# Patient Record
Sex: FEMALE | Race: OTHER | HISPANIC OR LATINO | URBAN - METROPOLITAN AREA
[De-identification: names, ages, dates, MRNs, and addresses within clinical notes are randomized per-mention and may not be internally consistent; named-entity substitution may affect disease eponyms.]

---

## 2023-06-05 ENCOUNTER — EMERGENCY (EMERGENCY)
Facility: HOSPITAL | Age: 88
LOS: 1 days | Discharge: HOME | End: 2023-06-05
Attending: STUDENT IN AN ORGANIZED HEALTH CARE EDUCATION/TRAINING PROGRAM | Admitting: STUDENT IN AN ORGANIZED HEALTH CARE EDUCATION/TRAINING PROGRAM
Payer: MEDICARE

## 2023-06-05 VITALS
SYSTOLIC BLOOD PRESSURE: 149 MMHG | OXYGEN SATURATION: 100 % | HEART RATE: 87 BPM | RESPIRATION RATE: 16 BRPM | DIASTOLIC BLOOD PRESSURE: 81 MMHG | TEMPERATURE: 98 F

## 2023-06-05 VITALS
HEART RATE: 90 BPM | OXYGEN SATURATION: 100 % | SYSTOLIC BLOOD PRESSURE: 124 MMHG | TEMPERATURE: 100 F | RESPIRATION RATE: 18 BRPM | DIASTOLIC BLOOD PRESSURE: 64 MMHG

## 2023-06-05 PROCEDURE — 99284 EMERGENCY DEPT VISIT MOD MDM: CPT | Mod: 25

## 2023-06-05 PROCEDURE — 70450 CT HEAD/BRAIN W/O DYE: CPT | Mod: 26,MA

## 2023-06-05 PROCEDURE — G0168: CPT

## 2023-06-05 PROCEDURE — 93010 ELECTROCARDIOGRAM REPORT: CPT

## 2023-06-05 PROCEDURE — 72125 CT NECK SPINE W/O DYE: CPT | Mod: 26,MA

## 2023-06-05 NOTE — ED PROVIDER NOTE - OBJECTIVE STATEMENT
89F h/o HTN presents s/p fall. Pt was walking to get her cane when she tripped and fell, striking head on floor. Got up under own strength and walked outside to her son-in-law who alerted daughter and brought to ED. No LOC. Ambulatory. Denies weakness/numbness. No headache, dizziness, neck pain, chest pain, vision changes, sob, hip pain or other complaints. Full memory of event. No prodromal symptoms prior to fall.

## 2023-06-05 NOTE — ED PROVIDER NOTE - NSFOLLOWUPINSTRUCTIONS_ED_ALL_ED_FT
Follow up with your primary care doctor    Take tylenol 650mg every 6 hours as needed for pain    Wash face like you normally would though avoid scrubbing the area    Return to ED if wound opens, area becomes red, any pus/drainage from site, dizziness/lightheadedness, more sleepy than usual, vision changes or anything else concerning to you,

## 2023-06-05 NOTE — ED ADULT TRIAGE NOTE - CHIEF COMPLAINT QUOTE
c/o trip and fall at home , presents with 1 cm forehead laceration no bleeding noted, hx of HLD, denies blood thinner use, PERRLA extremities are strong and equal B/L

## 2023-06-05 NOTE — ED PROVIDER NOTE - PATIENT PORTAL LINK FT
You can access the FollowMyHealth Patient Portal offered by [facility] by registering at the following website: [email or web address]. By joining [employer]’s FollowMyHealth portal, you will also be able to view your health information using other applications (apps) compatible with our system.

## 2025-01-12 ENCOUNTER — INPATIENT (INPATIENT)
Facility: HOSPITAL | Age: 89
LOS: 2 days | Discharge: NURSING, REHAB OR LONG-TERM CARE | End: 2025-01-15
Attending: STUDENT IN AN ORGANIZED HEALTH CARE EDUCATION/TRAINING PROGRAM | Admitting: STUDENT IN AN ORGANIZED HEALTH CARE EDUCATION/TRAINING PROGRAM
Payer: MEDICARE

## 2025-01-12 VITALS
OXYGEN SATURATION: 96 % | TEMPERATURE: 98 F | RESPIRATION RATE: 18 BRPM | SYSTOLIC BLOOD PRESSURE: 149 MMHG | HEART RATE: 68 BPM | WEIGHT: 89.95 LBS | DIASTOLIC BLOOD PRESSURE: 76 MMHG

## 2025-01-12 DIAGNOSIS — R79.89 OTHER SPECIFIED ABNORMAL FINDINGS OF BLOOD CHEMISTRY: ICD-10-CM

## 2025-01-12 DIAGNOSIS — Z79.899 OTHER LONG TERM (CURRENT) DRUG THERAPY: ICD-10-CM

## 2025-01-12 DIAGNOSIS — R29.6 REPEATED FALLS: ICD-10-CM

## 2025-01-12 DIAGNOSIS — E78.5 HYPERLIPIDEMIA, UNSPECIFIED: ICD-10-CM

## 2025-01-12 DIAGNOSIS — E03.9 HYPOTHYROIDISM, UNSPECIFIED: ICD-10-CM

## 2025-01-12 DIAGNOSIS — I10 ESSENTIAL (PRIMARY) HYPERTENSION: ICD-10-CM

## 2025-01-12 DIAGNOSIS — I21.4 NON-ST ELEVATION (NSTEMI) MYOCARDIAL INFARCTION: ICD-10-CM

## 2025-01-12 DIAGNOSIS — R53.1 WEAKNESS: ICD-10-CM

## 2025-01-12 LAB
ADD ON TEST-SPECIMEN IN LAB: SIGNIFICANT CHANGE UP
ADD ON TEST-SPECIMEN IN LAB: SIGNIFICANT CHANGE UP
ALBUMIN SERPL ELPH-MCNC: 3.6 G/DL — SIGNIFICANT CHANGE UP (ref 3.3–5)
ALP SERPL-CCNC: 64 U/L — SIGNIFICANT CHANGE UP (ref 40–120)
ALT FLD-CCNC: 94 U/L — HIGH (ref 4–33)
ANION GAP SERPL CALC-SCNC: 17 MMOL/L — HIGH (ref 7–14)
ANISOCYTOSIS BLD QL: SLIGHT — SIGNIFICANT CHANGE UP
AST SERPL-CCNC: 74 U/L — HIGH (ref 4–32)
BASOPHILS # BLD AUTO: 0 K/UL — SIGNIFICANT CHANGE UP (ref 0–0.2)
BASOPHILS NFR BLD AUTO: 0 % — SIGNIFICANT CHANGE UP (ref 0–2)
BILIRUB SERPL-MCNC: 0.7 MG/DL — SIGNIFICANT CHANGE UP (ref 0.2–1.2)
BUN SERPL-MCNC: 52 MG/DL — HIGH (ref 7–23)
CALCIUM SERPL-MCNC: 9.2 MG/DL — SIGNIFICANT CHANGE UP (ref 8.4–10.5)
CHLORIDE SERPL-SCNC: 102 MMOL/L — SIGNIFICANT CHANGE UP (ref 98–107)
CK SERPL-CCNC: 529 U/L — HIGH (ref 25–170)
CO2 SERPL-SCNC: 22 MMOL/L — SIGNIFICANT CHANGE UP (ref 22–31)
CREAT SERPL-MCNC: 0.7 MG/DL — SIGNIFICANT CHANGE UP (ref 0.5–1.3)
EGFR: 76 ML/MIN/1.73M2 — SIGNIFICANT CHANGE UP
EOSINOPHIL # BLD AUTO: 0 K/UL — SIGNIFICANT CHANGE UP (ref 0–0.5)
EOSINOPHIL NFR BLD AUTO: 0 % — SIGNIFICANT CHANGE UP (ref 0–6)
FLUAV AG NPH QL: SIGNIFICANT CHANGE UP
FLUBV AG NPH QL: SIGNIFICANT CHANGE UP
GLUCOSE SERPL-MCNC: 121 MG/DL — HIGH (ref 70–99)
HCT VFR BLD CALC: 43.4 % — SIGNIFICANT CHANGE UP (ref 34.5–45)
HGB BLD-MCNC: 14.5 G/DL — SIGNIFICANT CHANGE UP (ref 11.5–15.5)
IANC: 9.81 K/UL — HIGH (ref 1.8–7.4)
LYMPHOCYTES # BLD AUTO: 0.28 K/UL — LOW (ref 1–3.3)
LYMPHOCYTES # BLD AUTO: 2.6 % — LOW (ref 13–44)
MACROCYTES BLD QL: SLIGHT — SIGNIFICANT CHANGE UP
MAGNESIUM SERPL-MCNC: 2.2 MG/DL — SIGNIFICANT CHANGE UP (ref 1.6–2.6)
MCHC RBC-ENTMCNC: 30.8 PG — SIGNIFICANT CHANGE UP (ref 27–34)
MCHC RBC-ENTMCNC: 33.4 G/DL — SIGNIFICANT CHANGE UP (ref 32–36)
MCV RBC AUTO: 92.1 FL — SIGNIFICANT CHANGE UP (ref 80–100)
METAMYELOCYTES # FLD: 0.9 % — SIGNIFICANT CHANGE UP (ref 0–1)
MONOCYTES # BLD AUTO: 0.47 K/UL — SIGNIFICANT CHANGE UP (ref 0–0.9)
MONOCYTES NFR BLD AUTO: 4.3 % — SIGNIFICANT CHANGE UP (ref 2–14)
NEUTROPHILS # BLD AUTO: 10.09 K/UL — HIGH (ref 1.8–7.4)
NEUTROPHILS NFR BLD AUTO: 91.4 % — HIGH (ref 43–77)
NEUTS BAND # BLD: 0.8 % — SIGNIFICANT CHANGE UP (ref 0–6)
OVALOCYTES BLD QL SMEAR: SLIGHT — SIGNIFICANT CHANGE UP
PHOSPHATE SERPL-MCNC: 3 MG/DL — SIGNIFICANT CHANGE UP (ref 2.5–4.5)
PLAT MORPH BLD: NORMAL — SIGNIFICANT CHANGE UP
PLATELET # BLD AUTO: 216 K/UL — SIGNIFICANT CHANGE UP (ref 150–400)
PLATELET COUNT - ESTIMATE: NORMAL — SIGNIFICANT CHANGE UP
POIKILOCYTOSIS BLD QL AUTO: SLIGHT — SIGNIFICANT CHANGE UP
POTASSIUM SERPL-MCNC: 4 MMOL/L — SIGNIFICANT CHANGE UP (ref 3.5–5.3)
POTASSIUM SERPL-SCNC: 4 MMOL/L — SIGNIFICANT CHANGE UP (ref 3.5–5.3)
PROT SERPL-MCNC: 6.2 G/DL — SIGNIFICANT CHANGE UP (ref 6–8.3)
RBC # BLD: 4.71 M/UL — SIGNIFICANT CHANGE UP (ref 3.8–5.2)
RBC # FLD: 13.6 % — SIGNIFICANT CHANGE UP (ref 10.3–14.5)
RBC BLD AUTO: NORMAL — SIGNIFICANT CHANGE UP
RSV RNA NPH QL NAA+NON-PROBE: SIGNIFICANT CHANGE UP
SARS-COV-2 RNA SPEC QL NAA+PROBE: SIGNIFICANT CHANGE UP
SODIUM SERPL-SCNC: 141 MMOL/L — SIGNIFICANT CHANGE UP (ref 135–145)
TROPONIN T, HIGH SENSITIVITY RESULT: 217 NG/L — CRITICAL HIGH
TROPONIN T, HIGH SENSITIVITY RESULT: 257 NG/L — CRITICAL HIGH
WBC # BLD: 10.94 K/UL — HIGH (ref 3.8–10.5)
WBC # FLD AUTO: 10.94 K/UL — HIGH (ref 3.8–10.5)

## 2025-01-12 PROCEDURE — 72125 CT NECK SPINE W/O DYE: CPT | Mod: 26

## 2025-01-12 PROCEDURE — 99285 EMERGENCY DEPT VISIT HI MDM: CPT | Mod: FS

## 2025-01-12 PROCEDURE — 70450 CT HEAD/BRAIN W/O DYE: CPT | Mod: 26

## 2025-01-12 PROCEDURE — 99497 ADVNCD CARE PLAN 30 MIN: CPT | Mod: 25

## 2025-01-12 PROCEDURE — 99223 1ST HOSP IP/OBS HIGH 75: CPT

## 2025-01-12 PROCEDURE — 71045 X-RAY EXAM CHEST 1 VIEW: CPT | Mod: 26

## 2025-01-12 PROCEDURE — 93010 ELECTROCARDIOGRAM REPORT: CPT | Mod: 77

## 2025-01-12 RX ORDER — GINKGO BILOBA 40 MG
3 CAPSULE ORAL AT BEDTIME
Refills: 0 | Status: DISCONTINUED | OUTPATIENT
Start: 2025-01-12 | End: 2025-01-15

## 2025-01-12 RX ORDER — ASPIRIN 81 MG
162 TABLET, DELAYED RELEASE (ENTERIC COATED) ORAL ONCE
Refills: 0 | Status: COMPLETED | OUTPATIENT
Start: 2025-01-12 | End: 2025-01-12

## 2025-01-12 RX ORDER — ONDANSETRON 4 MG/1
4 TABLET ORAL EVERY 8 HOURS
Refills: 0 | Status: DISCONTINUED | OUTPATIENT
Start: 2025-01-12 | End: 2025-01-15

## 2025-01-12 RX ORDER — SODIUM CHLORIDE 9 MG/ML
500 INJECTION, SOLUTION INTRAMUSCULAR; INTRAVENOUS; SUBCUTANEOUS ONCE
Refills: 0 | Status: COMPLETED | OUTPATIENT
Start: 2025-01-12 | End: 2025-01-12

## 2025-01-12 RX ORDER — LEVOTHYROXINE SODIUM 175 UG/1
75 TABLET ORAL DAILY
Refills: 0 | Status: DISCONTINUED | OUTPATIENT
Start: 2025-01-12 | End: 2025-01-15

## 2025-01-12 RX ORDER — HEPARIN SODIUM 1000 [USP'U]/ML
5000 INJECTION, SOLUTION INTRAVENOUS; SUBCUTANEOUS EVERY 12 HOURS
Refills: 0 | Status: DISCONTINUED | OUTPATIENT
Start: 2025-01-13 | End: 2025-01-15

## 2025-01-12 RX ORDER — ACETAMINOPHEN 80 MG/.8ML
650 SOLUTION/ DROPS ORAL EVERY 6 HOURS
Refills: 0 | Status: DISCONTINUED | OUTPATIENT
Start: 2025-01-12 | End: 2025-01-15

## 2025-01-12 RX ORDER — MAG HYDROX/ALUMINUM HYD/SIMETH 200-200-20
30 SUSPENSION, ORAL (FINAL DOSE FORM) ORAL EVERY 4 HOURS
Refills: 0 | Status: DISCONTINUED | OUTPATIENT
Start: 2025-01-12 | End: 2025-01-15

## 2025-01-12 RX ORDER — LOSARTAN POTASSIUM 100 MG/1
100 TABLET, FILM COATED ORAL
Refills: 0 | Status: DISCONTINUED | OUTPATIENT
Start: 2025-01-12 | End: 2025-01-15

## 2025-01-12 RX ORDER — ASPIRIN 81 MG
81 TABLET, DELAYED RELEASE (ENTERIC COATED) ORAL DAILY
Refills: 0 | Status: DISCONTINUED | OUTPATIENT
Start: 2025-01-13 | End: 2025-01-15

## 2025-01-12 RX ORDER — ATORVASTATIN CALCIUM 40 MG/1
10 TABLET, FILM COATED ORAL AT BEDTIME
Refills: 0 | Status: DISCONTINUED | OUTPATIENT
Start: 2025-01-12 | End: 2025-01-15

## 2025-01-12 RX ADMIN — Medication 162 MILLIGRAM(S): at 22:49

## 2025-01-12 RX ADMIN — SODIUM CHLORIDE 500 MILLILITER(S): 9 INJECTION, SOLUTION INTRAMUSCULAR; INTRAVENOUS; SUBCUTANEOUS at 14:29

## 2025-01-12 RX ADMIN — ATORVASTATIN CALCIUM 10 MILLIGRAM(S): 40 TABLET, FILM COATED ORAL at 22:49

## 2025-01-12 NOTE — ED ADULT NURSE NOTE - NSFALLHARMRISKINTERV_ED_ALL_ED

## 2025-01-12 NOTE — CONSULT NOTE ADULT - SUBJECTIVE AND OBJECTIVE BOX
Cardiology Attending Consultation Note     Patient seen and evaluated at bedside    Chief Complaint: s/p fall     HPI:  89 yr old female with a pmh of HTN, HLD, hypothyroidism, osteoporosis who presents with generalized weakness and increased number of falls over the past 4 days. PT reports the other day she had a fall where she hit her head- denies LOC. Reports another time she fell off the couch and was not able to life herself up.   Denies  headache, dizziness, chest pain, palpitations, SOB, abdominal pain, joint pain, diarrhea/constipation, urinary symptoms.  Vitals: T 98, HR 69, /57, RR 16 satting 100% RA (12 Jan 2025 21:54)      PMHx:   HTN (hypertension)    HLD (hyperlipidemia)    Hypothyroidism    Osteoporosis        PSHx:   No significant past surgical history        Allergies:  No Known Allergies      Home Meds:    Current Medications:   acetaminophen     Tablet .. 650 milliGRAM(s) Oral every 6 hours PRN  aluminum hydroxide/magnesium hydroxide/simethicone Suspension 30 milliLiter(s) Oral every 4 hours PRN  amLODIPine   Tablet 5 milliGRAM(s) Oral with breakfast  atorvastatin 10 milliGRAM(s) Oral at bedtime  levothyroxine 75 MICROGram(s) Oral daily  losartan 100 milliGRAM(s) Oral with breakfast  melatonin 3 milliGRAM(s) Oral at bedtime PRN  ondansetron Injectable 4 milliGRAM(s) IV Push every 8 hours PRN      FAMILY HISTORY:  No pertinent family history in first degree relatives        Social History: Personally reviewed   No tobacco, EtOH or IVDU     REVIEW OF SYSTEMS:  Constitutional:     [x ] negative [ ] fevers [ ] chills [ ] weight loss [ ] weight gain  HEENT:                  [x ] negative [ ] dry eyes [ ] eye irritation [ ] postnasal drip [ ] nasal congestion  CV:                         [ x] negative  [ ] chest pain [ ] orthopnea [ ] palpitations [ ] murmur  Resp:                     [x ] negative [ ] cough [ ] shortness of breath [ ] dyspnea [ ] wheezing [ ] sputum [ ]hemoptysis  GI:                          [ x] negative [ ] nausea [ ] vomiting [ ] diarrhea [ ] constipation [ ] abd pain [ ] dysphagia   :                        [ x] negative [ ] dysuria [ ] nocturia [ ] hematuria [ ] increased urinary frequency  Musculoskeletal: [x ] negative [ ] back pain [ ] myalgias [ ] arthralgias [ ] fracture  Skin:                       [ x] negative [ ] rash [ ] itch  Neurological:        [ x] negative [ ] headache [ ] dizziness [ ] syncope [ ] weakness [ ] numbness  Psychiatric:           [ x] negative [ ] anxiety [ ] depression  Endocrine:            [ x] negative [ ] diabetes [ ] thyroid problem  Heme/Lymph:      [ x] negative [ ] anemia [ ] bleeding problem  Allergic/Immune: [ x] negative [ ] itchy eyes [ ] nasal discharge [ ] hives [ ] angioedema    [ x] All other systems negative  [ ] Unable to assess ROS due to      Physical Exam:  T(F): 97.6 (01-12), Max: 98 (01-12)  HR: 76 (01-12) (68 - 76)  BP: 137/51 (01-12) (124/84 - 155/57)  RR: 17 (01-12)  SpO2: 97% (01-12)    Gen: Well appearing   HENNT: No JVP   CV: regular rate, regular rhtyhm, no murmur   Pulm: clear to auscultation bilaterally   Abdomen: Non-tender, non-distended   Ext: no edema b/l   Neuro: grossly non-focal     Cardiovascular Diagnostic Testing:      Labs: Personally reviewed                        14.5   10.94 )-----------( 216      ( 12 Jan 2025 14:05 )             43.4     01-12    141  |  102  |  52[H]  ----------------------------<  121[H]  4.0   |  22  |  0.70    Ca    9.2      12 Jan 2025 14:05  Phos  3.0     01-12  Mg     2.20     01-12    TPro  6.2  /  Alb  3.6  /  TBili  0.7  /  DBili  x   /  AST  74[H]  /  ALT  94[H]  /  AlkPhos  64  01-12

## 2025-01-12 NOTE — ED ADULT NURSE NOTE - OBJECTIVE STATEMENT
pt [id number] to ED s/p fall pt appears alert responsive and appropriate pt currently denies any pain md eval in progress v/s charted

## 2025-01-12 NOTE — H&P ADULT - NSHPPHYSICALEXAM_GEN_ALL_CORE
PHYSICAL EXAM:  GENERAL: NAD, comfortable at bedside   HEAD:  Atraumatic, Normocephalic  EYES: EOMI, PERRL, conjunctiva and sclera clear  NECK: Supple, No JVD  CHEST/LUNG: Clear to auscultation bilaterally; No wheezes, rales or rhonchi  HEART: Regular rate and rhythm; No murmurs, rubs, or gallops, (+)S1, S2  ABDOMEN: Soft, Nontender, Nondistended; Normal Bowel sounds   EXTREMITIES:  2+ Peripheral Pulses, No clubbing, cyanosis, or edema  PSYCH: normal mood and affect  NEUROLOGY: AAOx3, moving all extremities spontaneously  SKIN: chronic venous insufficiency changes of bilateral lower extremities

## 2025-01-12 NOTE — H&P ADULT - PROBLEM SELECTOR PLAN 2
New  Trop 257-> 217, -> 529  Pt denies chest pain   Per ED note: Cardiology note pending and ED signed out no need for heparin drip  Will start aspirin 81mg daily and continue simvastatin 20mg daily formulary  trend trop, repeat EKG New  Trop 257-> 217, -> 529  Pt denies chest pain   Per ED note: Cardiology note pending and ED signed out no need for heparin drip- called cards said to call back as with a pt   Will start aspirin 81mg daily and continue simvastatin 20mg daily formulary  trend trop, repeat EKG, tele, TTE New  Trop 257-> 217, -> 529  Pt denies chest pain   Per ED note: Cardiology note pending and ED signed out no need for heparin drip=- Called Cardiology who will see the pt  Will start aspirin 81mg daily and continue simvastatin 20mg daily formulary  trend trop, repeat EKG, tele, TTE New  Trop 257-> 217, -> 529  Pt denies chest pain   Cardiology consulted: given pt's advanced age and absence of ischemic sxs, would recommend conservative management and hold off heparin gtt. recommend TTE ECHO inpt for eval of LV/RV function and any significant WMAs.   Will start aspirin 81mg daily and continue simvastatin 20mg daily formulary  trend trop, repeat EKG, tele, TTE

## 2025-01-12 NOTE — CONSULT NOTE ADULT - ASSESSMENT
89F w/ HTN, HLD, hypothyroidism, and osteoporosis p/w generalized weakness and s/p fall to the ER. Cardiology consulted for elevated troponin.     1. HTN   2. HLD   3. Generalized weakness   4. S/p fall   5. Elevated troponin     -elevated troponin with CKMB and CK. no cp and EKG with NSR and mild TWI in V3-V4   -given pt's advanced age and absence of ischemic sxs, would recommend conservative management and hold off heparin gtt   -cont bp control, cont home losartan and amlodipine   -cont asa and statin   -recommend TTE ECHO inpt for eval of LV/RV function and any significant WMAs   -monitor on tele   -GOC discussion recommended     Adry Huitron MD [facility]  Cardiology Attending, [email or web address]  [employer] on [employer] Team

## 2025-01-12 NOTE — ED ADULT NURSE REASSESSMENT NOTE - NS ED NURSE REASSESS COMMENT FT1
Received pt from Roland KEITH. pt sitting quietly in stretcher NAD noted at this time. NSR on CM. No new orders at this time pt safety ongoing.

## 2025-01-12 NOTE — ED ADULT TRIAGE NOTE - CHIEF COMPLAINT QUOTE
pt brought in by EMS from home after sliding off the couch 2xs. pt denies hitting her head, no LOC. Pt lives alone and walks with a walker at baseline. pt denies chest pain, sob, n/v/d, fever or chills.

## 2025-01-12 NOTE — H&P ADULT - CONVERSATION DETAILS
Pt would like to remain FULL CODE.  If required, the pt would like the following interventions: CPR and/or intubation with mechanical ventilation.     Pt reports family is to visit from [place] and to speak with her Niece when she is here

## 2025-01-12 NOTE — ED PROVIDER NOTE - CLINICAL SUMMARY MEDICAL DECISION MAKING FREE TEXT BOX
- 82-year-old female past medical history of hypertension Alert and oriented x 3 presenting to ED from home via ambulance for fall.  Patient states over the last several days of been feeling generalized weakness.  States had slid down off her couch, bumped her head on the floor without loss of consciousness, no head neck or back pain and was unable to push herself back up off the floor.  Patient does endorse more frequent falls, denies any pain.  No vision changes, lightheadedness or dizziness, chest pain, palpitations, shortness of breath, Cough, fever/chills, urinary symptoms, abdominal pain, nausea/vomiting/diarrhea. Patient states lives at home by herself and is normally able to walk with a walker.  Patient only family is niece and her daughter who lives in [place].  - On exam patient alert and oriented x 4, states did strike head on floor after sliding down couch, no LOC, head neck or back pain.  Patient neuroexam intact, no midline spinal tenderness.  Low suspicion for ICH or acute cervical injury-given age will obtain CT head and neck to assess.  Given generalized weakness and frequent falls will obtain CMP to assess electrolyte abnormalities, CBC to assess leukocytosis/anemia, UA to assess UTI, EKG/troponin to assess ACS.  Viral swab to assess for flu/COVID/RSV.  Chest x-ray to assess pneumonia.  Dispo pending results and reassessment, likely admit due inability to walk/no support at home.

## 2025-01-12 NOTE — H&P ADULT - PROBLEM SELECTOR PLAN 3
New  pt reports increased falls past four days  Workup for generalized weakness as above   PT/OT consult New  pt reports increased falls past four days  CT head/cervical: no acute findings   Workup for generalized weakness as above   PT/OT consult

## 2025-01-12 NOTE — H&P ADULT - PROBLEM SELECTOR PLAN 6
Chronic stable  Continue levothyroxine 75 mg daily   TSH in AM   [facility]

## 2025-01-12 NOTE — ED PROVIDER NOTE - OBJECTIVE STATEMENT
82-year-old female past medical history of hypertension Alert and oriented x 3 presenting to ED from home via ambulance for fall.  Patient states over the last several days of been feeling generalized weakness.  States had slid down off her couch, bumped her head on the floor without loss of consciousness, no head neck or back pain and was unable to push herself back up off the floor.  Patient does endorse more frequent falls, denies any pain.  No vision changes, lightheadedness or dizziness, chest pain, palpitations, shortness of breath 82-year-old female past medical history of hypertension Alert and oriented x 3 presenting to ED from home via ambulance for fall.  Patient states over the last several days of been feeling generalized weakness.  States had slid down off her couch, bumped her head on the floor without loss of consciousness, no head neck or back pain and was unable to push herself back up off the floor.  Patient does endorse more frequent falls, denies any pain.  No vision changes, lightheadedness or dizziness, chest pain, palpitations, shortness of breath, Cough, fever/chills, urinary symptoms, abdominal pain, nausea/vomiting/diarrhea.Patient  lives at home by herself and is normally able to walk with a walker.  Patient only family is niece and her daughter who lives in [place]. 89-year-old female past medical history of hypertension Alert and oriented x 3 presenting to ED from home via ambulance for fall.  Patient states over the last several days of been feeling generalized weakness.  States had slid down off her couch, bumped her head on the floor without loss of consciousness, no head neck or back pain and was unable to push herself back up off the floor.  Patient does endorse more frequent falls, denies any pain.  No vision changes, lightheadedness or dizziness, chest pain, palpitations, shortness of breath, Cough, fever/chills, urinary symptoms, abdominal pain, nausea/vomiting/diarrhea.Patient  lives at home by herself and is normally able to walk with a walker.  Patient only family is niece and her daughter who lives in [place].

## 2025-01-12 NOTE — ED PROVIDER NOTE - PROGRESS NOTE DETAILS
BRENDA Singh NP: EKG unchanged from previous, no STEMI.  Patient currently denying chest pain/shortness of breath/palpitations.  Initial troponin 257, repeat 217.  CK-MB 32.4, CK6 38.  Cardiology consulted, eval pending.  Patient full code.  Spoke to patient niece on the phone, family and patient updated.  TBA pending cards recs. Nick Mc, DO: received s/o on pt. concern for elevated troponin. ekg non ischemic. plan for cards eval for nstemi eval. I have personally provided the amount of critical care time documented below, excluding time spent on separate procedures. 45 minutes.

## 2025-01-12 NOTE — H&P ADULT - NSHPREVIEWOFSYSTEMS_GEN_ALL_CORE
REVIEW OF SYSTEMS:    CONSTITUTIONAL: +generalized weakness and falls no fevers or chills  EYES/ENT: No visual changes;  No dysphagia; No sore throat; No rhinorrhea; No sinus pain/pressure  NECK: No pain or stiffness  RESPIRATORY: No cough, wheezing, hemoptysis; No shortness of breath  CARDIOVASCULAR: No chest pain or palpitations; No lower extremity edema  GASTROINTESTINAL: No abdominal or epigastric pain. No nausea, vomiting, or hematemesis; No diarrhea or constipation. No melena or hematochezia.  GENITOURINARY: No dysuria, frequency or hematuria  NEUROLOGICAL: No numbness or weakness  MSK: ambulates with a walker   SKIN: No itching, burning, rashes, or lesions   All other review of systems is negative unless indicated above.

## 2025-01-12 NOTE — H&P ADULT - PROBLEM SELECTOR PLAN 1
New  increased generalized weakness over 4 days  CT head/cervical: no acute findings  CXR: no focal consolidation   UA, orthostatics, Flu/COVID/RSV ordered  PT/OT consult

## 2025-01-12 NOTE — H&P ADULT - HISTORY OF PRESENT ILLNESS
89 yr old female with a pmh of HTN, HLD, hypothyroidism, osteoporosis who presents with generalized weakness and increased number of falls over the past 4 days. PT reports the other day she had a fall where she hit her head- denies LOC. Reports another time she fell off the couch and was not able to life herself up.   Denies  headache, dizziness, chest pain, palpitations, SOB, abdominal pain, joint pain, diarrhea/constipation, urinary symptoms.  Vitals: T 98, HR 69, /57, RR 16 satting 100% RA

## 2025-01-12 NOTE — ED ADULT NURSE NOTE - DRUG PRE-SCREENING (DAST -1)
Chon SAFIA Coley   12/15/2017 2:00 PM   Anticoagulation Therapy Visit    Description:  63 year old female   Provider:  TIEN BOSWELL   Department:  Ne Nurse           INR as of 12/15/2017     Today's INR 4.6!      Anticoagulation Summary as of 12/15/2017     INR goal 2.0-3.0   Today's INR 4.6!   Full instructions 12/15: Hold; 12/16: 5 mg; 12/19: 5 mg; Otherwise 7.5 mg on Tue, Thu, Sat; 5 mg all other days   Next INR check 12/22/2017    Indications   Long term current use of anticoagulant therapy [Z79.01]  Cerebral artery occlusion with cerebral infarction (H) [I63.50]         Your next Anticoagulation Clinic appointment(s)     Dec 15, 2017  2:00 PM CST   Anticoagulation Visit with NE ANTI COAG   [facility] ([address] [id number] [phone]            Dec 22, 2017 12:45 PM CST   Anticoagulation Visit with NE ANTI COAG   [facility] ([address] [id number] [phone]              Contact Numbers     Please call [phone] to cancel and/or reschedule your appointment.  Please call [phone] with any problems or questions regarding your therapy          December 2017 Details    Sun Mon Tue Wed Thu Fri Sat          1               2                 3               4               5               6               7               8               9                 10               11               12               13               14               15      Hold   See details      16      5 mg           17      5 mg         18      5 mg         19      5 mg         20      5 mg         21      7.5 mg         22            23                 24               25               26               27               28               29               30                 31                      Date Details   12/15 This INR check       Date of next INR:  12/22/2017          How to take your warfarin dose     To take:  5 mg Take 1 of the 5 mg tablets.    To take:  7.5 mg Take 1.5 of the 5 mg tablets.    Hold Do not take your warfarin dose. See the Details table to the right for additional instructions.                 Statement Selected

## 2025-01-12 NOTE — ED PROVIDER NOTE - ATTENDING APP SHARED VISIT CONTRIBUTION OF CARE
Seen and examined, discussed with NP.  Patient status post fall was sliding off couch landed on side and then bumped her head on floor.  No LOC, denies headache or neck pain.  Patient required assistance to get off the floor.  Has had previous falls but they have been more frequent.  Denies recent major injury.  Patient lives at home alone and at baseline uses walker.  Endorses increased weakness and decreased p.o. intake.  Denies recent illness, no fever/chills, no N/V/D, no urinary complaints.  MMM, clear lungs, heart reg, abd soft, NT to palp, no austyn/guarding, no CVAT, no edema, NT calves.

## 2025-01-12 NOTE — H&P ADULT - PROBLEM SELECTOR PLAN 4
Chronic moderate exacerbation   /57  Continue losartan 100mg daily and amlodipine 5mg daily   Monitor

## 2025-01-12 NOTE — H&P ADULT - NSHPSOCIALHISTORY_GEN_ALL_CORE
Does not use tobacco products, consume alcohol or partake in illicit drug use   Pt lives alone- has neighbor help her with groceries

## 2025-01-12 NOTE — H&P ADULT - PROBLEM SELECTOR PLAN 7
Pt does not know names from medications nor doses[facility]

## 2025-01-12 NOTE — H&P ADULT - NSHPLABSRESULTS_GEN_ALL_CORE
14.5   10.94 )-----------( 216      ( 12 Jan 2025 14:05 )             43.4     141  |  102  |  52[H]  ----------------------------<  121[H]     01-12  4.0   |  22  |  0.70    Ca    9.2      12 Jan 2025 14:05  Phos  3.0     01-12  Mg     2.20     01-12    TPro  6.2  /  Alb  3.6  /  TBili  0.7  /  DBili  x   /  AST  74[H]  /  ALT  94[H]  /  AlkPhos  64  01-12      hs Troponin, T - 217 ng/L (01-12-25 @ 16:05)  hs Troponin, T - 257 ng/L (01-12-25 @ 14:05)        EKG interpreted by myself: TWI V3-V4    images interpreted by radiology:   CT HEAD: No acute intracranial injury.    CT CERVICAL SPINE: No cervical vertebral fracture.      CXR:  No active infiltrates

## 2025-01-13 LAB
24R-OH-CALCIDIOL SERPL-MCNC: 41.2 NG/ML — SIGNIFICANT CHANGE UP (ref 30–80)
A1C WITH ESTIMATED AVERAGE GLUCOSE RESULT: 5.8 % — HIGH (ref 4–5.6)
ANION GAP SERPL CALC-SCNC: 16 MMOL/L — HIGH (ref 7–14)
APPEARANCE UR: CLEAR — SIGNIFICANT CHANGE UP
BACTERIA # UR AUTO: NEGATIVE /HPF — SIGNIFICANT CHANGE UP
BASOPHILS # BLD AUTO: 0.02 K/UL — SIGNIFICANT CHANGE UP (ref 0–0.2)
BASOPHILS NFR BLD AUTO: 0.2 % — SIGNIFICANT CHANGE UP (ref 0–2)
BILIRUB UR-MCNC: NEGATIVE — SIGNIFICANT CHANGE UP
BUN SERPL-MCNC: 40 MG/DL — HIGH (ref 7–23)
CALCIUM SERPL-MCNC: 8.9 MG/DL — SIGNIFICANT CHANGE UP (ref 8.4–10.5)
CAST: 2 /LPF — SIGNIFICANT CHANGE UP (ref 0–4)
CHLORIDE SERPL-SCNC: 106 MMOL/L — SIGNIFICANT CHANGE UP (ref 98–107)
CHOLEST SERPL-MCNC: 151 MG/DL — SIGNIFICANT CHANGE UP
CK MB BLD-MCNC: 5.3 % — HIGH (ref 0–2.5)
CK MB CFR SERPL CALC: 20.6 NG/ML — HIGH
CK SERPL-CCNC: 390 U/L — HIGH (ref 25–170)
CO2 SERPL-SCNC: 21 MMOL/L — LOW (ref 22–31)
COLOR SPEC: YELLOW — SIGNIFICANT CHANGE UP
CREAT SERPL-MCNC: 0.65 MG/DL — SIGNIFICANT CHANGE UP (ref 0.5–1.3)
DIFF PNL FLD: NEGATIVE — SIGNIFICANT CHANGE UP
EGFR: 78 ML/MIN/1.73M2 — SIGNIFICANT CHANGE UP
EOSINOPHIL # BLD AUTO: 0.03 K/UL — SIGNIFICANT CHANGE UP (ref 0–0.5)
EOSINOPHIL NFR BLD AUTO: 0.3 % — SIGNIFICANT CHANGE UP (ref 0–6)
ESTIMATED AVERAGE GLUCOSE: 120 — SIGNIFICANT CHANGE UP
FOLATE SERPL-MCNC: >20 NG/ML — HIGH (ref 3.1–17.5)
GLUCOSE SERPL-MCNC: 92 MG/DL — SIGNIFICANT CHANGE UP (ref 70–99)
GLUCOSE UR QL: 100 MG/DL
HCT VFR BLD CALC: 42.5 % — SIGNIFICANT CHANGE UP (ref 34.5–45)
HDLC SERPL-MCNC: 66 MG/DL — SIGNIFICANT CHANGE UP
HGB BLD-MCNC: 14.2 G/DL — SIGNIFICANT CHANGE UP (ref 11.5–15.5)
IANC: 8.67 K/UL — HIGH (ref 1.8–7.4)
IMM GRANULOCYTES NFR BLD AUTO: 0.6 % — SIGNIFICANT CHANGE UP (ref 0–0.9)
KETONES UR-MCNC: 15 MG/DL
LEUKOCYTE ESTERASE UR-ACNC: ABNORMAL
LIPID PNL WITH DIRECT LDL SERPL: 68 MG/DL — SIGNIFICANT CHANGE UP
LYMPHOCYTES # BLD AUTO: 0.52 K/UL — LOW (ref 1–3.3)
LYMPHOCYTES # BLD AUTO: 5.2 % — LOW (ref 13–44)
MCHC RBC-ENTMCNC: 30.8 PG — SIGNIFICANT CHANGE UP (ref 27–34)
MCHC RBC-ENTMCNC: 33.4 G/DL — SIGNIFICANT CHANGE UP (ref 32–36)
MCV RBC AUTO: 92.2 FL — SIGNIFICANT CHANGE UP (ref 80–100)
MONOCYTES # BLD AUTO: 0.74 K/UL — SIGNIFICANT CHANGE UP (ref 0–0.9)
MONOCYTES NFR BLD AUTO: 7.4 % — SIGNIFICANT CHANGE UP (ref 2–14)
NEUTROPHILS # BLD AUTO: 8.67 K/UL — HIGH (ref 1.8–7.4)
NEUTROPHILS NFR BLD AUTO: 86.3 % — HIGH (ref 43–77)
NITRITE UR-MCNC: NEGATIVE — SIGNIFICANT CHANGE UP
NON HDL CHOLESTEROL: 85 MG/DL — SIGNIFICANT CHANGE UP
NRBC # BLD: 0 /100 WBCS — SIGNIFICANT CHANGE UP (ref 0–0)
NRBC # FLD: 0 K/UL — SIGNIFICANT CHANGE UP (ref 0–0)
PH UR: 6.5 — SIGNIFICANT CHANGE UP (ref 5–8)
PLATELET # BLD AUTO: 237 K/UL — SIGNIFICANT CHANGE UP (ref 150–400)
POTASSIUM SERPL-MCNC: 3.4 MMOL/L — LOW (ref 3.5–5.3)
POTASSIUM SERPL-SCNC: 3.4 MMOL/L — LOW (ref 3.5–5.3)
PROT UR-MCNC: 30 MG/DL
RBC # BLD: 4.61 M/UL — SIGNIFICANT CHANGE UP (ref 3.8–5.2)
RBC # FLD: 13.8 % — SIGNIFICANT CHANGE UP (ref 10.3–14.5)
RBC CASTS # UR COMP ASSIST: 2 /HPF — SIGNIFICANT CHANGE UP (ref 0–4)
SODIUM SERPL-SCNC: 143 MMOL/L — SIGNIFICANT CHANGE UP (ref 135–145)
SP GR SPEC: 1.03 — SIGNIFICANT CHANGE UP (ref 1–1.03)
SQUAMOUS # UR AUTO: 1 /HPF — SIGNIFICANT CHANGE UP (ref 0–5)
T4 FREE+ TSH PNL SERPL: 2.92 UIU/ML — SIGNIFICANT CHANGE UP (ref 0.27–4.2)
TRIGL SERPL-MCNC: 93 MG/DL — SIGNIFICANT CHANGE UP
TROPONIN T, HIGH SENSITIVITY RESULT: 271 NG/L — CRITICAL HIGH
UROBILINOGEN FLD QL: 1 MG/DL — SIGNIFICANT CHANGE UP (ref 0.2–1)
VIT B12 SERPL-MCNC: 1733 PG/ML — HIGH (ref 200–900)
WBC # BLD: 10.04 K/UL — SIGNIFICANT CHANGE UP (ref 3.8–10.5)
WBC # FLD AUTO: 10.04 K/UL — SIGNIFICANT CHANGE UP (ref 3.8–10.5)
WBC UR QL: 1 /HPF — SIGNIFICANT CHANGE UP (ref 0–5)

## 2025-01-13 PROCEDURE — 99233 SBSQ HOSP IP/OBS HIGH 50: CPT

## 2025-01-13 RX ORDER — LEVOTHYROXINE SODIUM 175 UG/1
1 TABLET ORAL
Refills: 0 | DISCHARGE

## 2025-01-13 RX ORDER — POTASSIUM CHLORIDE 600 MG/1
40 TABLET, FILM COATED, EXTENDED RELEASE ORAL ONCE
Refills: 0 | Status: COMPLETED | OUTPATIENT
Start: 2025-01-13 | End: 2025-01-13

## 2025-01-13 RX ORDER — LOSARTAN POTASSIUM 100 MG/1
1 TABLET, FILM COATED ORAL
Refills: 0 | DISCHARGE

## 2025-01-13 RX ORDER — SENNOSIDES 8.6 MG/1
2 TABLET, FILM COATED ORAL AT BEDTIME
Refills: 0 | Status: DISCONTINUED | OUTPATIENT
Start: 2025-01-13 | End: 2025-01-15

## 2025-01-13 RX ORDER — SIMVASTATIN 5 MG/1
1 TABLET, FILM COATED ORAL
Refills: 0 | DISCHARGE

## 2025-01-13 RX ORDER — SIMVASTATIN 5 MG/1
0 TABLET, FILM COATED ORAL
Qty: 0 | Refills: 1 | DISCHARGE

## 2025-01-13 RX ORDER — ALENDRONATE SODIUM 5 MG/1
1 TABLET ORAL
Refills: 0 | DISCHARGE

## 2025-01-13 RX ORDER — SODIUM CHLORIDE 9 MG/ML
1000 INJECTION, SOLUTION INTRAMUSCULAR; INTRAVENOUS; SUBCUTANEOUS
Refills: 0 | Status: DISCONTINUED | OUTPATIENT
Start: 2025-01-13 | End: 2025-01-15

## 2025-01-13 RX ORDER — LOSARTAN POTASSIUM 100 MG/1
0 TABLET, FILM COATED ORAL
Qty: 0 | Refills: 1 | DISCHARGE

## 2025-01-13 RX ORDER — ALENDRONATE SODIUM 5 MG/1
0 TABLET ORAL
Qty: 0 | Refills: 1 | DISCHARGE

## 2025-01-13 RX ORDER — POLYETHYLENE GLYCOL 3350 17 G/DOSE
17 POWDER (GRAM) ORAL ONCE
Refills: 0 | Status: DISCONTINUED | OUTPATIENT
Start: 2025-01-13 | End: 2025-01-15

## 2025-01-13 RX ORDER — LEVOTHYROXINE SODIUM 175 UG/1
0 TABLET ORAL
Qty: 0 | Refills: 1 | DISCHARGE

## 2025-01-13 RX ORDER — INFLUENZA A VIRUS A/WISCONSIN/588/2019 (H1N1) RECOMBINANT HEMAGGLUTININ ANTIGEN, INFLUENZA A VIRUS A/DARWIN/6/2021 (H3N2) RECOMBINANT HEMAGGLUTININ ANTIGEN, INFLUENZA B VIRUS B/AUSTRIA/1359417/2021 RECOMBINANT HEMAGGLUTININ ANTIGEN, AND INFLUENZA B VIRUS B/PHUKET/3073/2013 RECOMBINANT HEMAGGLUTININ ANTIGEN 45; 45; 45; 45 UG/.5ML; UG/.5ML; UG/.5ML; UG/.5ML
0.5 INJECTION INTRAMUSCULAR ONCE
Refills: 0 | Status: DISCONTINUED | OUTPATIENT
Start: 2025-01-13 | End: 2025-01-15

## 2025-01-13 RX ADMIN — Medication 5 MILLIGRAM(S): at 13:06

## 2025-01-13 RX ADMIN — SODIUM CHLORIDE 50 MILLILITER(S): 9 INJECTION, SOLUTION INTRAMUSCULAR; INTRAVENOUS; SUBCUTANEOUS at 10:42

## 2025-01-13 RX ADMIN — Medication 81 MILLIGRAM(S): at 12:58

## 2025-01-13 RX ADMIN — LOSARTAN POTASSIUM 100 MILLIGRAM(S): 100 TABLET, FILM COATED ORAL at 12:58

## 2025-01-13 RX ADMIN — HEPARIN SODIUM 5000 UNIT(S): 1000 INJECTION, SOLUTION INTRAVENOUS; SUBCUTANEOUS at 17:28

## 2025-01-13 RX ADMIN — ATORVASTATIN CALCIUM 10 MILLIGRAM(S): 40 TABLET, FILM COATED ORAL at 22:01

## 2025-01-13 RX ADMIN — LEVOTHYROXINE SODIUM 75 MICROGRAM(S): 175 TABLET ORAL at 07:12

## 2025-01-13 RX ADMIN — POTASSIUM CHLORIDE 40 MILLIEQUIVALENT(S): 600 TABLET, FILM COATED, EXTENDED RELEASE ORAL at 10:43

## 2025-01-13 NOTE — OCCUPATIONAL THERAPY INITIAL EVALUATION ADULT - PRECAUTIONS/LIMITATIONS, REHAB EVAL
Pt with visual impairments and hard of hearing/aspiration precautions/cardiac precautions/fall precautions/hearing precautions/vision precautions

## 2025-01-13 NOTE — PHARMACOTHERAPY INTERVENTION NOTE - COMMENTS
Medication history is complete. Medication list updated in Outpatient Medication Record (OMR). Please call spectra [id number] if you have any questions.     Medication history obtained from patient's outpatient pharmacy as patient unable to recall list on interview. Attempted to call patient's family for additional confirmation of list from pharmacy, however, no response despite multiple attempts. List in OMR is updated based on recent list from outpatient pharmacy.

## 2025-01-13 NOTE — PHYSICAL THERAPY INITIAL EVALUATION ADULT - PERTINENT HX OF CURRENT PROBLEM, REHAB EVAL
Patient is 89 year old female , history of HTN, HLD, hypothyroidism, osteoporosis who presents with generalized weakness and increased number of falls over the past 4 days.

## 2025-01-13 NOTE — PATIENT PROFILE ADULT - FALL HARM RISK - HARM RISK INTERVENTIONS

## 2025-01-13 NOTE — OCCUPATIONAL THERAPY INITIAL EVALUATION ADULT - PERTINENT HX OF CURRENT PROBLEM, REHAB EVAL
As per H&P: "89 yr old female with a pmh of HTN, HLD, hypothyroidism, osteoporosis who presents with generalized weakness and increased number of falls over the past 4 days. PT reports the other day she had a fall where she hit her head- denies LOC. Reports another time she fell off the couch and was not able to life herself up."    Admit dx: N-STEMI  CT Head: Negative impression for neuro event  CT C-Spine: Negative impression for fracture

## 2025-01-13 NOTE — PROGRESS NOTE ADULT - CONVERSATION DETAILS
Spoke to patient about advanced directives again since pt is a 89 year old. She does not have a health care proxy and would like to make her own decisions, patient reinstates she would like to remain full code and fully understands what it consists of in the case of cardiac or respiratory failure. Topic was readdressed in the case that the patient did not understand and due to her age however pt remains having the same decision and can comprehend. Pt does not have family but has a niece in [place] who will visit her today.

## 2025-01-13 NOTE — OCCUPATIONAL THERAPY INITIAL EVALUATION ADULT - LEVEL OF INDEPENDENCE: DRESS LOWER BODY, OT EVAL
Physical Therapy Visit    Referred by: Arline Anders MD; Medical Diagnosis (from order):    Diagnosis Information      Diagnosis    719.45 (ICD-9-CM) - M25.551 (ICD-10-CM) - Acute pain of right hip              Visit: 5    Visit Type: Daily Treatment Note    SUBJECTIVE                                                                                                               No pain today. She went on a vacation. I am feeling good. I walked a lot and did really well with that.     OBJECTIVE                                                                                                                        TREATMENT                                                                                                                  Therapeutic Exercise:  Nu step: level 5 x 6 min   6 inch forward x 15 bilateral  6 inch lateral step up x 15 bilateral   Squats x 20   Hip abduction x 15   Hip extension x 15   Heel raises x 20     Manual Therapy:  STM to surrounding hip musculature     Skilled input: verbal instruction/cues    Writer verbally educated and received verbal consent for hand placement, positioning of patient, and techniques to be performed today from patient for clothing adjustments for techniques, hand placement and palpation for techniques and therapist position for techniques as described above and how they are pertinent to the patient's plan of care.    Home Exercise Program/Education Materials: Exercises   Seated Hip Adduction Isometrics with Ball - 2 x daily - 10 reps - 2-3 sec hold   Seated Isometric Hip Abduction with Belt - 2 x daily - 10 reps - 2-3 sec hold   Seated Hamstring Set - 2 x daily - 10 reps - 2-3 sec hold   Deep breathing   Sit to stand        ASSESSMENT                                                                                                             Patient able to exercise with fair tolerance. Some pain noted with hip abduction. Patient continues to see mental health and  is aware of other things to help.   Patient Education:   Results of above outlined education: Verbalizes understanding and Demonstrates understanding      PLAN                                                                                                                           Suggestions for next session as indicated: Progress per plan of care, continue exercise based care, core strength, hip strength, balance          Therapy procedure time and total treatment time can be found documented on the Time Entry flowsheet   maximum assist (25% patients effort)

## 2025-01-13 NOTE — OCCUPATIONAL THERAPY INITIAL EVALUATION ADULT - ADDITIONAL COMMENTS
Pt lives alone in a house with 4 steps to enter, tub bathroom. Pt reports being independent with ADLs and ambulating with a rolling walker prior to admission. Pt reports pt's niece is coming to [place] to assist her, but unsure for how long. Per EMR, pt with multiple falls.    Patient left semisupine in bed in NAD, HOB 30 degrees. All lines intact and bed alarm on.

## 2025-01-13 NOTE — PROGRESS NOTE ADULT - SUBJECTIVE AND OBJECTIVE BOX
Cardiology Attending Follow-up Note     Patient seen and examined at bedside.    Overnight Events:     mental status improving   no cp or cardiac sxs     REVIEW OF SYSTEMS:  Constitutional:     [x ] negative [ ] fevers [ ] chills [ ] weight loss [ ] weight gain  HEENT:                  [x ] negative [ ] dry eyes [ ] eye irritation [ ] postnasal drip [ ] nasal congestion  CV:                         [ x] negative  [ ] chest pain [ ] orthopnea [ ] palpitations [ ] murmur  Resp:                     [ x] negative [ ] cough [ ] shortness of breath [ ] dyspnea [ ] wheezing [ ] sputum [ ]hemoptysis  GI:                          [ x] negative [ ] nausea [ ] vomiting [ ] diarrhea [ ] constipation [ ] abd pain [ ] dysphagia   :                        [ x] negative [ ] dysuria [ ] nocturia [ ] hematuria [ ] increased urinary frequency  Musculoskeletal: [ x] negative [ ] back pain [ ] myalgias [ ] arthralgias [ ] fracture  Skin:                       [ x] negative [ ] rash [ ] itch  Neurological:        [x ] negative [ ] headache [ ] dizziness [ ] syncope [ ] weakness [ ] numbness  Psychiatric:           [ x] negative [ ] anxiety [ ] depression  Endocrine:            [ x] negative [ ] diabetes [ ] thyroid problem  Heme/Lymph:      [ x] negative [ ] anemia [ ] bleeding problem  Allergic/Immune: [ x] negative [ ] itchy eyes [ ] nasal discharge [ ] hives [ ] angioedema    [ x] All other systems negative  [ ] Unable to assess ROS due to    Current Meds:  acetaminophen     Tablet .. 650 milliGRAM(s) Oral every 6 hours PRN  aluminum hydroxide/magnesium hydroxide/simethicone Suspension 30 milliLiter(s) Oral every 4 hours PRN  amLODIPine   Tablet 5 milliGRAM(s) Oral with breakfast  aspirin enteric coated 81 milliGRAM(s) Oral daily  atorvastatin 10 milliGRAM(s) Oral at bedtime  heparin   Injectable 5000 Unit(s) SubCutaneous every 12 hours  influenza  Vaccine (HIGH DOSE) 0.5 milliLiter(s) IntraMuscular once  levothyroxine 75 MICROGram(s) Oral daily  losartan 100 milliGRAM(s) Oral with breakfast  melatonin 3 milliGRAM(s) Oral at bedtime PRN  ondansetron Injectable 4 milliGRAM(s) IV Push every 8 hours PRN  polyethylene glycol 3350 17 Gram(s) Oral once  senna 2 Tablet(s) Oral at bedtime  sodium chloride 0.9%. 1000 milliLiter(s) IV Continuous <Continuous>      PAST MEDICAL & SURGICAL HISTORY:  HTN (hypertension)      HLD (hyperlipidemia)      Hypothyroidism      Osteoporosis      No significant past surgical history          Vitals:  T(F): 99 (), Max: 99 ()  HR: 71 () (66 - 73)  BP: 136/56 () (123/46 - 142/71)  RR: 17 ()  SpO2: 97% ()  I&O's Summary    2025 07:01  -  2025 23:11  --------------------------------------------------------  IN: 720 mL / OUT: 700 mL / NET: 20 mL        Physical Exam:  Appearance: No acute distress  HENT: No JVD   Cardiovascular: RRR, S1/S2, no murmurs  Respiratory: CTABL  Gastrointestinal: soft, NT ND, +BS  Musculoskeletal: No clubbing, no edema   Neurologic: Non-focal  Skin: No rashes, ecchymoses, or cyanosis                          14.2   10.04 )-----------( 237      ( 2025 06:35 )             42.5         143  |  106  |  40[H]  ----------------------------<  92  3.4[L]   |  21[L]  |  0.65    Ca    8.9      2025 06:35  Phos  3.0       Mg     2.20         TPro  6.2  /  Alb  3.6  /  TBili  0.7  /  DBili  x   /  AST  74[H]  /  ALT  94[H]  /  AlkPhos  64        CARDIAC MARKERS ( 2025 01:30 )  x     / x     / x     / x     / 20.6 ng/mL  CARDIAC MARKERS ( 2025 14:05 )  x     / x     / x     / x     / 32.4 ng/mL        Total Cholesterol: 151  LDL: --  HDL: 66  T        Cardiovascular Testings:

## 2025-01-13 NOTE — PROGRESS NOTE ADULT - SUBJECTIVE AND OBJECTIVE BOX
[facility] Division of Hospital Medicine  aNe Gonzalez MD  Pager (M-F, 8A-5P)    89 yr old female with a pmh of HTN, HLD, hypothyroidism, osteoporosis who presents with generalized weakness and increased number of falls over the past 4 days. PT reports the other day she had a fall where she hit her head- denies LOC. Reports another time she fell off the couch and was not able to life herself up. Pt lives at home alone and has a niece in [place] who is on her way to visit her.   Denies headache, dizziness, chest pain, palpitations, SOB, abdominal pain, joint pain, diarrhea/constipation, urinary symptoms.    MEDICATIONS  (STANDING):  amLODIPine   Tablet 5 milliGRAM(s) Oral with breakfast  aspirin enteric coated 81 milliGRAM(s) Oral daily  atorvastatin 10 milliGRAM(s) Oral at bedtime  heparin   Injectable 5000 Unit(s) SubCutaneous every 12 hours  influenza  Vaccine (HIGH DOSE) 0.5 milliLiter(s) IntraMuscular once  levothyroxine 75 MICROGram(s) Oral daily  losartan 100 milliGRAM(s) Oral with breakfast  sodium chloride 0.9%. 1000 milliLiter(s) (50 mL/Hr) IV Continuous <Continuous>    MEDICATIONS  (PRN):  acetaminophen     Tablet .. 650 milliGRAM(s) Oral every 6 hours PRN Temp greater or equal to 38C (100.4F), Mild Pain (1 - 3)  aluminum hydroxide/magnesium hydroxide/simethicone Suspension 30 milliLiter(s) Oral every 4 hours PRN Dyspepsia  melatonin 3 milliGRAM(s) Oral at bedtime PRN Insomnia  ondansetron Injectable 4 milliGRAM(s) IV Push every 8 hours PRN Nausea and/or Vomiting      CAPILLARY BLOOD GLUCOSE        I&O's Summary      PHYSICAL EXAM:  Vital Signs Last 24 Hrs  T(C): 37.1 (13 Jan 2025 10:10), Max: 37.1 (13 Jan 2025 10:10)  T(F): 98.7 (13 Jan 2025 10:10), Max: 98.7 (13 Jan 2025 10:10)  HR: 66 (13 Jan 2025 10:10) (66 - 76)  BP: 123/46 (13 Jan 2025 10:10) (123/46 - 155/57)  BP(mean): 83 (12 Jan 2025 20:53) (83 - 84)  RR: 17 (13 Jan 2025 10:10) (16 - 18)  SpO2: 99% (13 Jan 2025 10:10) (97% - 100%)    Parameters below as of 13 Jan 2025 10:10  Patient On (Oxygen Delivery Method): room air      CONSTITUTIONAL: NAD, elderly woman lying in bed, has difficulty hearing and seeing  EYES: PERRLA; conjunctiva and sclera clear  ENMT: Moist oral mucosa, no pharyngeal injection or exudates  NECK: Supple, no palpable masses; no thyromegaly  RESPIRATORY: Normal respiratory effort; lungs are clear to auscultation bilaterally  CARDIOVASCULAR: Regular rate and rhythm, normal S1 and S2, no murmur/rub/gallop; No lower extremity edema; Peripheral pulses are 2+ bilaterally  ABDOMEN: Nontender to palpation, normoactive bowel sounds, no rebound/guarding; No hepatosplenomegaly  MUSCLOSKELETAL: very frail, normal range of motion and muscle tone  PSYCH: A+O to person, place, and time; affect appropriate  NEUROLOGY: CN 2-12 are intact and symmetric; no gross sensory deficits;   SKIN: No rashes; no palpable lesions    LABS:                        14.2   10.04 )-----------( 237      ( 13 Jan 2025 06:35 )             42.5     01-13    143  |  106  |  40[H]  ----------------------------<  92  3.4[L]   |  21[L]  |  0.65    Ca    8.9      13 Jan 2025 06:35  Phos  3.0     01-12  Mg     2.20     01-12    TPro  6.2  /  Alb  3.6  /  TBili  0.7  /  DBili  x   /  AST  74[H]  /  ALT  94[H]  /  AlkPhos  64  01-12      CARDIAC MARKERS ( 13 Jan 2025 01:30 )  x     / x     / x     / x     / 20.6 ng/mL  CARDIAC MARKERS ( 12 Jan 2025 14:05 )  x     / x     / x     / x     / 32.4 ng/mL      Urinalysis Basic - ( 13 Jan 2025 06:35 )    Color: x / Appearance: x / SG: x / pH: x  Gluc: 92 mg/dL / Ketone: x  / Bili: x / Urobili: x   Blood: x / Protein: x / Nitrite: x   Leuk Esterase: x / RBC: x / WBC x   Sq Epi: x / Non Sq Epi: x / Bacteria: x

## 2025-01-13 NOTE — OCCUPATIONAL THERAPY INITIAL EVALUATION ADULT - VISUAL ACUITY
Pt wearing glasses. Pt with visual impairments, difficulty identifying objects at distances greater than ~6 inches away from face.

## 2025-01-13 NOTE — OCCUPATIONAL THERAPY INITIAL EVALUATION ADULT - NS ASR FOLLOW COMMAND OT EVAL
Pt benefits from simple one-step commands to due being hard of hearing/100% of the time/able to follow single-step instructions

## 2025-01-14 LAB
ANION GAP SERPL CALC-SCNC: 10 MMOL/L — SIGNIFICANT CHANGE UP (ref 7–14)
BASOPHILS # BLD AUTO: 0.02 K/UL — SIGNIFICANT CHANGE UP (ref 0–0.2)
BASOPHILS NFR BLD AUTO: 0.3 % — SIGNIFICANT CHANGE UP (ref 0–2)
BUN SERPL-MCNC: 24 MG/DL — HIGH (ref 7–23)
CALCIUM SERPL-MCNC: 8.3 MG/DL — LOW (ref 8.4–10.5)
CHLORIDE SERPL-SCNC: 107 MMOL/L — SIGNIFICANT CHANGE UP (ref 98–107)
CK SERPL-CCNC: 170 U/L — SIGNIFICANT CHANGE UP (ref 25–170)
CO2 SERPL-SCNC: 23 MMOL/L — SIGNIFICANT CHANGE UP (ref 22–31)
CREAT SERPL-MCNC: 0.54 MG/DL — SIGNIFICANT CHANGE UP (ref 0.5–1.3)
EGFR: 81 ML/MIN/1.73M2 — SIGNIFICANT CHANGE UP
EOSINOPHIL # BLD AUTO: 0.12 K/UL — SIGNIFICANT CHANGE UP (ref 0–0.5)
EOSINOPHIL NFR BLD AUTO: 1.5 % — SIGNIFICANT CHANGE UP (ref 0–6)
GLUCOSE SERPL-MCNC: 109 MG/DL — HIGH (ref 70–99)
HCT VFR BLD CALC: 42.7 % — SIGNIFICANT CHANGE UP (ref 34.5–45)
HGB BLD-MCNC: 13.8 G/DL — SIGNIFICANT CHANGE UP (ref 11.5–15.5)
IANC: 6.21 K/UL — SIGNIFICANT CHANGE UP (ref 1.8–7.4)
IMM GRANULOCYTES NFR BLD AUTO: 0.9 % — SIGNIFICANT CHANGE UP (ref 0–0.9)
LYMPHOCYTES # BLD AUTO: 0.68 K/UL — LOW (ref 1–3.3)
LYMPHOCYTES # BLD AUTO: 8.7 % — LOW (ref 13–44)
MCHC RBC-ENTMCNC: 30.2 PG — SIGNIFICANT CHANGE UP (ref 27–34)
MCHC RBC-ENTMCNC: 32.3 G/DL — SIGNIFICANT CHANGE UP (ref 32–36)
MCV RBC AUTO: 93.4 FL — SIGNIFICANT CHANGE UP (ref 80–100)
MONOCYTES # BLD AUTO: 0.75 K/UL — SIGNIFICANT CHANGE UP (ref 0–0.9)
MONOCYTES NFR BLD AUTO: 9.6 % — SIGNIFICANT CHANGE UP (ref 2–14)
NEUTROPHILS # BLD AUTO: 6.21 K/UL — SIGNIFICANT CHANGE UP (ref 1.8–7.4)
NEUTROPHILS NFR BLD AUTO: 79 % — HIGH (ref 43–77)
NRBC # BLD: 0 /100 WBCS — SIGNIFICANT CHANGE UP (ref 0–0)
NRBC # FLD: 0 K/UL — SIGNIFICANT CHANGE UP (ref 0–0)
PLATELET # BLD AUTO: 250 K/UL — SIGNIFICANT CHANGE UP (ref 150–400)
POTASSIUM SERPL-MCNC: 3.8 MMOL/L — SIGNIFICANT CHANGE UP (ref 3.5–5.3)
POTASSIUM SERPL-SCNC: 3.8 MMOL/L — SIGNIFICANT CHANGE UP (ref 3.5–5.3)
RBC # BLD: 4.57 M/UL — SIGNIFICANT CHANGE UP (ref 3.8–5.2)
RBC # FLD: 14.2 % — SIGNIFICANT CHANGE UP (ref 10.3–14.5)
SODIUM SERPL-SCNC: 140 MMOL/L — SIGNIFICANT CHANGE UP (ref 135–145)
WBC # BLD: 7.85 K/UL — SIGNIFICANT CHANGE UP (ref 3.8–10.5)
WBC # FLD AUTO: 7.85 K/UL — SIGNIFICANT CHANGE UP (ref 3.8–10.5)

## 2025-01-14 PROCEDURE — 93306 TTE W/DOPPLER COMPLETE: CPT | Mod: 26

## 2025-01-14 PROCEDURE — 99233 SBSQ HOSP IP/OBS HIGH 50: CPT

## 2025-01-14 PROCEDURE — 99223 1ST HOSP IP/OBS HIGH 75: CPT | Mod: FS

## 2025-01-14 RX ADMIN — Medication 5 MILLIGRAM(S): at 09:20

## 2025-01-14 RX ADMIN — SENNOSIDES 2 TABLET(S): 8.6 TABLET, FILM COATED ORAL at 21:31

## 2025-01-14 RX ADMIN — Medication 81 MILLIGRAM(S): at 12:05

## 2025-01-14 RX ADMIN — HEPARIN SODIUM 5000 UNIT(S): 1000 INJECTION, SOLUTION INTRAVENOUS; SUBCUTANEOUS at 06:35

## 2025-01-14 RX ADMIN — LOSARTAN POTASSIUM 100 MILLIGRAM(S): 100 TABLET, FILM COATED ORAL at 09:20

## 2025-01-14 RX ADMIN — HEPARIN SODIUM 5000 UNIT(S): 1000 INJECTION, SOLUTION INTRAVENOUS; SUBCUTANEOUS at 18:34

## 2025-01-14 RX ADMIN — SODIUM CHLORIDE 50 MILLILITER(S): 9 INJECTION, SOLUTION INTRAMUSCULAR; INTRAVENOUS; SUBCUTANEOUS at 07:38

## 2025-01-14 RX ADMIN — ATORVASTATIN CALCIUM 10 MILLIGRAM(S): 40 TABLET, FILM COATED ORAL at 21:31

## 2025-01-14 RX ADMIN — SODIUM CHLORIDE 50 MILLILITER(S): 9 INJECTION, SOLUTION INTRAMUSCULAR; INTRAVENOUS; SUBCUTANEOUS at 12:05

## 2025-01-14 RX ADMIN — LEVOTHYROXINE SODIUM 75 MICROGRAM(S): 175 TABLET ORAL at 06:34

## 2025-01-14 NOTE — PROGRESS NOTE ADULT - SUBJECTIVE AND OBJECTIVE BOX
[facility] Division of Hospital Medicine  Nae Gonzalez MD  Pager (M-F, 8A-5P)      89 yr old female with a pmhx of HTN, HLD, hypothyroidism, osteoporosis who presents with generalized weakness and increased number of falls over the past 4 days. PT reports the other day she had a fall where she hit her head- denies LOC. Reports another time she fell off the couch and was not able to life herself up. Pt lives at home alone and has a niece in [place] who is on her way to visit her. Denies headache, dizziness, chest pain, palpitations, SOB, abdominal pain, joint pain, diarrhea/constipation, urinary symptoms.      MEDICATIONS  (STANDING):  amLODIPine   Tablet 5 milliGRAM(s) Oral with breakfast  aspirin enteric coated 81 milliGRAM(s) Oral daily  atorvastatin 10 milliGRAM(s) Oral at bedtime  heparin   Injectable 5000 Unit(s) SubCutaneous every 12 hours  influenza  Vaccine (HIGH DOSE) 0.5 milliLiter(s) IntraMuscular once  levothyroxine 75 MICROGram(s) Oral daily  losartan 100 milliGRAM(s) Oral with breakfast  polyethylene glycol 3350 17 Gram(s) Oral once  senna 2 Tablet(s) Oral at bedtime  sodium chloride 0.9%. 1000 milliLiter(s) (50 mL/Hr) IV Continuous <Continuous>    MEDICATIONS  (PRN):  acetaminophen     Tablet .. 650 milliGRAM(s) Oral every 6 hours PRN Temp greater or equal to 38C (100.4F), Mild Pain (1 - 3)  aluminum hydroxide/magnesium hydroxide/simethicone Suspension 30 milliLiter(s) Oral every 4 hours PRN Dyspepsia  melatonin 3 milliGRAM(s) Oral at bedtime PRN Insomnia  ondansetron Injectable 4 milliGRAM(s) IV Push every 8 hours PRN Nausea and/or Vomiting      CAPILLARY BLOOD GLUCOSE        I&O's Summary    13 Jan 2025 07:01  -  14 Jan 2025 07:00  --------------------------------------------------------  IN: 1320 mL / OUT: 700 mL / NET: 620 mL        PHYSICAL EXAM:  Vital Signs Last 24 Hrs  T(C): 37 (14 Jan 2025 06:30), Max: 37.2 (13 Jan 2025 17:33)  T(F): 98.6 (14 Jan 2025 06:30), Max: 99 (13 Jan 2025 17:33)  HR: 65 (14 Jan 2025 09:15) (65 - 71)  BP: 127/66 (14 Jan 2025 09:15) (127/66 - 144/58)  BP(mean): --  RR: 16 (14 Jan 2025 06:30) (16 - 17)  SpO2: 95% (14 Jan 2025 06:30) (95% - 100%)    Parameters below as of 14 Jan 2025 06:30  Patient On (Oxygen Delivery Method): room air      CONSTITUTIONAL: NAD, well-developed, well-groomed  EYES: PERRLA; conjunctiva and sclera clear  ENMT: Moist oral mucosa, no pharyngeal injection or exudates; normal dentition  NECK: Supple, no palpable masses; no thyromegaly  RESPIRATORY: Normal respiratory effort; lungs are clear to auscultation bilaterally  CARDIOVASCULAR: Regular rate and rhythm, normal S1 and S2, no murmur/rub/gallop; No lower extremity edema; Peripheral pulses are 2+ bilaterally  ABDOMEN: Nontender to palpation, normoactive bowel sounds, no rebound/guarding; No hepatosplenomegaly  MUSCLOSKELETAL:  Normal gait; no clubbing or cyanosis of digits; no joint swelling or tenderness to palpation  PSYCH: A+O to person, place, and time; affect appropriate  NEUROLOGY: CN 2-12 are intact and symmetric; no gross sensory deficits;   SKIN: No rashes; no palpable lesions    LABS:                        13.8   7.85  )-----------( 250      ( 14 Jan 2025 06:45 )             42.7     01-14    140  |  107  |  24[H]  ----------------------------<  109[H]  3.8   |  23  |  0.54    Ca    8.3[L]      14 Jan 2025 06:45  Phos  3.0     01-12  Mg     2.20     01-12    TPro  6.2  /  Alb  3.6  /  TBili  0.7  /  DBili  x   /  AST  74[H]  /  ALT  94[H]  /  AlkPhos  64  01-12      CARDIAC MARKERS ( 13 Jan 2025 01:30 )  x     / x     / x     / x     / 20.6 ng/mL  CARDIAC MARKERS ( 12 Jan 2025 14:05 )  x     / x     / x     / x     / 32.4 ng/mL      Urinalysis Basic - ( 14 Jan 2025 06:45 )    Color: x / Appearance: x / SG: x / pH: x  Gluc: 109 mg/dL / Ketone: x  / Bili: x / Urobili: x   Blood: x / Protein: x / Nitrite: x   Leuk Esterase: x / RBC: x / WBC x   Sq Epi: x / Non Sq Epi: x / Bacteria: x        Urinalysis with Rflx Culture (collected 13 Jan 2025 15:30)        RADIOLOGY & ADDITIONAL TESTS:  Results Reviewed:   Imaging Personally Reviewed:  Electrocardiogram Personally Reviewed:    COORDINATION OF CARE:  Care Discussed with Consultants/Other Providers [Y/N]:  Prior or Outpatient Records Reviewed [Y/N]:

## 2025-01-14 NOTE — PROGRESS NOTE ADULT - SUBJECTIVE AND OBJECTIVE BOX
Cardiology Attending Follow-up Note     Patient seen and examined at bedside.    Overnight Events:       REVIEW OF SYSTEMS:  Constitutional:     [x ] negative [ ] fevers [ ] chills [ ] weight loss [ ] weight gain  HEENT:                  [x ] negative [ ] dry eyes [ ] eye irritation [ ] postnasal drip [ ] nasal congestion  CV:                         [ x] negative  [ ] chest pain [ ] orthopnea [ ] palpitations [ ] murmur  Resp:                     [ x] negative [ ] cough [ ] shortness of breath [ ] dyspnea [ ] wheezing [ ] sputum [ ]hemoptysis  GI:                          [ x] negative [ ] nausea [ ] vomiting [ ] diarrhea [ ] constipation [ ] abd pain [ ] dysphagia   :                        [ x] negative [ ] dysuria [ ] nocturia [ ] hematuria [ ] increased urinary frequency  Musculoskeletal: [ x] negative [ ] back pain [ ] myalgias [ ] arthralgias [ ] fracture  Skin:                       [ x] negative [ ] rash [ ] itch  Neurological:        [x ] negative [ ] headache [ ] dizziness [ ] syncope [ ] weakness [ ] numbness  Psychiatric:           [ x] negative [ ] anxiety [ ] depression  Endocrine:            [ x] negative [ ] diabetes [ ] thyroid problem  Heme/Lymph:      [ x] negative [ ] anemia [ ] bleeding problem  Allergic/Immune: [ x] negative [ ] itchy eyes [ ] nasal discharge [ ] hives [ ] angioedema    [ x] All other systems negative  [ ] Unable to assess ROS due to    Current Meds:  acetaminophen     Tablet .. 650 milliGRAM(s) Oral every 6 hours PRN  aluminum hydroxide/magnesium hydroxide/simethicone Suspension 30 milliLiter(s) Oral every 4 hours PRN  amLODIPine   Tablet 5 milliGRAM(s) Oral with breakfast  aspirin enteric coated 81 milliGRAM(s) Oral daily  atorvastatin 10 milliGRAM(s) Oral at bedtime  heparin   Injectable 5000 Unit(s) SubCutaneous every 12 hours  influenza  Vaccine (HIGH DOSE) 0.5 milliLiter(s) IntraMuscular once  levothyroxine 75 MICROGram(s) Oral daily  losartan 100 milliGRAM(s) Oral with breakfast  melatonin 3 milliGRAM(s) Oral at bedtime PRN  ondansetron Injectable 4 milliGRAM(s) IV Push every 8 hours PRN  polyethylene glycol 3350 17 Gram(s) Oral once  senna 2 Tablet(s) Oral at bedtime  sodium chloride 0.9%. 1000 milliLiter(s) IV Continuous <Continuous>      PAST MEDICAL & SURGICAL HISTORY:  HTN (hypertension)      HLD (hyperlipidemia)      Hypothyroidism      Osteoporosis      No significant past surgical history          Vitals:  T(F): 98.4 (01-14), Max: 98.9 (01-14)  HR: 73 (01-14) (65 - 73)  BP: 132/71 (01-14) (127/66 - 134/61)  RR: 16 (01-14)  SpO2: 99% (01-14)  I&O's Summary    13 Jan 2025 07:01  -  14 Jan 2025 07:00  --------------------------------------------------------  IN: 1320 mL / OUT: 700 mL / NET: 620 mL        Physical Exam:  Appearance: No acute distress  HENT: No JVD   Cardiovascular: RRR, S1/S2, no murmurs  Respiratory: CTABL  Gastrointestinal: soft, NT ND, +BS  Musculoskeletal: No clubbing, no edema   Neurologic: Non-focal  Skin: No rashes, ecchymoses, or cyanosis                          13.8   7.85  )-----------( 250      ( 14 Jan 2025 06:45 )             42.7     01-14    140  |  107  |  24[H]  ----------------------------<  109[H]  3.8   |  23  |  0.54    Ca    8.3[L]      14 Jan 2025 06:45        CARDIAC MARKERS ( 13 Jan 2025 01:30 )  x     / x     / x     / x     / 20.6 ng/mL              Cardiovascular Testings:      Cardiology Attending Follow-up Note     Patient seen and examined at bedside.    Overnight Events:     no significant events   no cardiac sxs   metnal status appear to be waxing and wanning     REVIEW OF SYSTEMS:  Constitutional:     [x ] negative [ ] fevers [ ] chills [ ] weight loss [ ] weight gain  HEENT:                  [x ] negative [ ] dry eyes [ ] eye irritation [ ] postnasal drip [ ] nasal congestion  CV:                         [ x] negative  [ ] chest pain [ ] orthopnea [ ] palpitations [ ] murmur  Resp:                     [ x] negative [ ] cough [ ] shortness of breath [ ] dyspnea [ ] wheezing [ ] sputum [ ]hemoptysis  GI:                          [ x] negative [ ] nausea [ ] vomiting [ ] diarrhea [ ] constipation [ ] abd pain [ ] dysphagia   :                        [ x] negative [ ] dysuria [ ] nocturia [ ] hematuria [ ] increased urinary frequency  Musculoskeletal: [ x] negative [ ] back pain [ ] myalgias [ ] arthralgias [ ] fracture  Skin:                       [ x] negative [ ] rash [ ] itch  Neurological:        [x ] negative [ ] headache [ ] dizziness [ ] syncope [ ] weakness [ ] numbness  Psychiatric:           [ x] negative [ ] anxiety [ ] depression  Endocrine:            [ x] negative [ ] diabetes [ ] thyroid problem  Heme/Lymph:      [ x] negative [ ] anemia [ ] bleeding problem  Allergic/Immune: [ x] negative [ ] itchy eyes [ ] nasal discharge [ ] hives [ ] angioedema    [ x] All other systems negative  [ ] Unable to assess ROS due to    Current Meds:  acetaminophen     Tablet .. 650 milliGRAM(s) Oral every 6 hours PRN  aluminum hydroxide/magnesium hydroxide/simethicone Suspension 30 milliLiter(s) Oral every 4 hours PRN  amLODIPine   Tablet 5 milliGRAM(s) Oral with breakfast  aspirin enteric coated 81 milliGRAM(s) Oral daily  atorvastatin 10 milliGRAM(s) Oral at bedtime  heparin   Injectable 5000 Unit(s) SubCutaneous every 12 hours  influenza  Vaccine (HIGH DOSE) 0.5 milliLiter(s) IntraMuscular once  levothyroxine 75 MICROGram(s) Oral daily  losartan 100 milliGRAM(s) Oral with breakfast  melatonin 3 milliGRAM(s) Oral at bedtime PRN  ondansetron Injectable 4 milliGRAM(s) IV Push every 8 hours PRN  polyethylene glycol 3350 17 Gram(s) Oral once  senna 2 Tablet(s) Oral at bedtime  sodium chloride 0.9%. 1000 milliLiter(s) IV Continuous <Continuous>      PAST MEDICAL & SURGICAL HISTORY:  HTN (hypertension)      HLD (hyperlipidemia)      Hypothyroidism      Osteoporosis      No significant past surgical history          Vitals:  T(F): 98.4 (01-14), Max: 98.9 (01-14)  HR: 73 (01-14) (65 - 73)  BP: 132/71 (01-14) (127/66 - 134/61)  RR: 16 (01-14)  SpO2: 99% (01-14)  I&O's Summary    13 Jan 2025 07:01  -  14 Jan 2025 07:00  --------------------------------------------------------  IN: 1320 mL / OUT: 700 mL / NET: 620 mL        Physical Exam:  Appearance: No acute distress  HENT: No JVD   Cardiovascular: RRR, S1/S2, no murmurs  Respiratory: CTABL  Gastrointestinal: soft, NT ND, +BS  Musculoskeletal: No clubbing, no edema   Neurologic: Non-focal  Skin: No rashes, ecchymoses, or cyanosis                          13.8   7.85  )-----------( 250      ( 14 Jan 2025 06:45 )             42.7     01-14    140  |  107  |  24[H]  ----------------------------<  109[H]  3.8   |  23  |  0.54    Ca    8.3[L]      14 Jan 2025 06:45        CARDIAC MARKERS ( 13 Jan 2025 01:30 )  x     / x     / x     / x     / 20.6 ng/mL              Cardiovascular Testings:

## 2025-01-15 VITALS
RESPIRATION RATE: 18 BRPM | OXYGEN SATURATION: 95 % | HEART RATE: 94 BPM | DIASTOLIC BLOOD PRESSURE: 65 MMHG | SYSTOLIC BLOOD PRESSURE: 148 MMHG | TEMPERATURE: 98 F

## 2025-01-15 PROCEDURE — 99232 SBSQ HOSP IP/OBS MODERATE 35: CPT

## 2025-01-15 PROCEDURE — 99233 SBSQ HOSP IP/OBS HIGH 50: CPT

## 2025-01-15 RX ORDER — ASCORBIC ACID 1000 MG
500 TABLET ORAL DAILY
Refills: 0 | Status: DISCONTINUED | OUTPATIENT
Start: 2025-01-15 | End: 2025-01-15

## 2025-01-15 RX ORDER — IRON/LYS/VIT B COMP/FOLIC ACID 800-1MG/15
1 LIQUID (ML) ORAL
Qty: 0 | Refills: 0 | DISCHARGE
Start: 2025-01-15

## 2025-01-15 RX ORDER — SENNOSIDES 8.6 MG/1
2 TABLET, FILM COATED ORAL
Qty: 0 | Refills: 0 | DISCHARGE
Start: 2025-01-15

## 2025-01-15 RX ORDER — IRON/LYS/VIT B COMP/FOLIC ACID 800-1MG/15
1 LIQUID (ML) ORAL DAILY
Refills: 0 | Status: DISCONTINUED | OUTPATIENT
Start: 2025-01-15 | End: 2025-01-15

## 2025-01-15 RX ORDER — POLYETHYLENE GLYCOL 3350 17 G/DOSE
17 POWDER (GRAM) ORAL
Qty: 0 | Refills: 0 | DISCHARGE
Start: 2025-01-15

## 2025-01-15 RX ORDER — B COMPLEX, C NO.20/FOLIC ACID 1 MG
1 CAPSULE ORAL DAILY
Refills: 0 | Status: DISCONTINUED | OUTPATIENT
Start: 2025-01-15 | End: 2025-01-15

## 2025-01-15 RX ORDER — GINKGO BILOBA 40 MG
1 CAPSULE ORAL
Qty: 0 | Refills: 0 | DISCHARGE
Start: 2025-01-15

## 2025-01-15 RX ORDER — ASPIRIN 81 MG
1 TABLET, DELAYED RELEASE (ENTERIC COATED) ORAL
Qty: 0 | Refills: 0 | DISCHARGE
Start: 2025-01-15

## 2025-01-15 RX ORDER — MAG HYDROX/ALUMINUM HYD/SIMETH 200-200-20
30 SUSPENSION, ORAL (FINAL DOSE FORM) ORAL
Qty: 0 | Refills: 0 | DISCHARGE
Start: 2025-01-15

## 2025-01-15 RX ORDER — ASCORBIC ACID 1000 MG
1 TABLET ORAL
Qty: 0 | Refills: 0 | DISCHARGE
Start: 2025-01-15

## 2025-01-15 RX ORDER — ACETAMINOPHEN 80 MG/.8ML
2 SOLUTION/ DROPS ORAL
Qty: 0 | Refills: 0 | DISCHARGE
Start: 2025-01-15

## 2025-01-15 RX ORDER — ATORVASTATIN CALCIUM 40 MG/1
1 TABLET, FILM COATED ORAL
Qty: 0 | Refills: 0 | DISCHARGE
Start: 2025-01-15

## 2025-01-15 RX ADMIN — LEVOTHYROXINE SODIUM 75 MICROGRAM(S): 175 TABLET ORAL at 06:32

## 2025-01-15 RX ADMIN — ACETAMINOPHEN 650 MILLIGRAM(S): 80 SOLUTION/ DROPS ORAL at 12:30

## 2025-01-15 RX ADMIN — Medication 5 MILLIGRAM(S): at 07:01

## 2025-01-15 RX ADMIN — Medication 81 MILLIGRAM(S): at 11:34

## 2025-01-15 RX ADMIN — ACETAMINOPHEN 650 MILLIGRAM(S): 80 SOLUTION/ DROPS ORAL at 11:34

## 2025-01-15 RX ADMIN — HEPARIN SODIUM 5000 UNIT(S): 1000 INJECTION, SOLUTION INTRAVENOUS; SUBCUTANEOUS at 06:33

## 2025-01-15 RX ADMIN — LOSARTAN POTASSIUM 100 MILLIGRAM(S): 100 TABLET, FILM COATED ORAL at 07:01

## 2025-01-15 RX ADMIN — HEPARIN SODIUM 5000 UNIT(S): 1000 INJECTION, SOLUTION INTRAVENOUS; SUBCUTANEOUS at 18:13

## 2025-01-15 RX ADMIN — Medication 1 TABLET(S): at 18:13

## 2025-01-15 RX ADMIN — Medication 500 MILLIGRAM(S): at 11:39

## 2025-01-15 NOTE — DISCHARGE NOTE NURSING/CASE MANAGEMENT/SOCIAL WORK - NSDCFUADDAPPT_GEN_ALL_CORE_FT
Follow up with medicine physician at rehab facility.     Follow up with your cardiologist or [facility] cardiology after discharge from rehab.

## 2025-01-15 NOTE — PROVIDER CONTACT NOTE (OTHER) - ASSESSMENT
Iv site infiltrated, iv removed. Right Hand and wrist swollen, +3 edema, denies pain, pt can move hand- ring on finger is loose and mobile, per provider assessment at bedside. All pulses present, pt denies any numbness and tingling in right upper extremity. Iv site infiltrated, iv removed. left Hand and wrist swollen, +3 edema, denies pain, pt can move hand- ring on finger is loose and mobile, per provider assessment at bedside. All pulses present, pt denies any numbness and tingling in left upper extremity.

## 2025-01-15 NOTE — DISCHARGE NOTE PROVIDER - ATTENDING DISCHARGE PHYSICAL EXAMINATION:
Vital Signs Last 24 Hrs  T(C): 36.9 (15 Kirit 2025 14:20), Max: 36.9 (14 Jan 2025 20:30)  T(F): 98.5 (15 Kirit 2025 14:20), Max: 98.5 (15 Kirit 2025 14:20)  HR: 89 (15 Kirit 2025 14:20) (66 - 89)  BP: 135/66 (15 Kirit 2025 14:20) (132/71 - 135/66)  BP(mean): --  RR: 17 (15 Kirit 2025 14:20) (16 - 18)  SpO2: 96% (15 Kirit 2025 14:20) (96% - 100%)    Parameters below as of 15 Kirit 2025 14:20  Patient On (Oxygen Delivery Method): room air        CONSTITUTIONAL: Well-groomed, in no apparent distress  EYES: No conjunctival or scleral injection, non-icteric;   ENMT: No external nasal lesions; MMM  NECK: Trachea midline without palpable neck mass; thyroid not enlarged and non-tender  RESPIRATORY: Breathing comfortably; no dullness to percussion; lungs CTA without wheeze/rhonchi/rales  CARDIOVASCULAR: +S1S2, RRR, no M/G/R; pedal pulses full and symmetric; no lower extremity edema  GASTROINTESTINAL: No palpable masses or tenderness, +BS throughout, no rebound/guarding; no hepatosplenomegaly; no hernia palpated  LYMPHATIC: No cervical LAD or tenderness  SKIN: No rashes or ulcers noted  NEUROLOGIC: CN II-XII intact; sensation intact in LEs b/l to light touch  PSYCHIATRIC: A+O x 3; mood and affect appropriate; appropriate insight and judgment

## 2025-01-15 NOTE — DISCHARGE NOTE NURSING/CASE MANAGEMENT/SOCIAL WORK - PATIENT PORTAL LINK FT
You can access the FollowMyHealth Patient Portal offered by [facility] by registering at the following website: [email or web address]. By joining [employer]’s FollowMyHealth portal, you will also be able to view your health information using other applications (apps) compatible with our system.

## 2025-01-15 NOTE — PROGRESS NOTE ADULT - PROBLEM SELECTOR PLAN 1
increased generalized weakness over 4 days  CT head/cervical: no acute findings  CXR: no focal consolidation   UA result pending  orthostatics   Flu/COVID/RSV negative  PT/OT following  Started gentle hydration N/S @ 50cc/hr  Started pureed diet with moderately thick liquids
increased generalized weakness over 4 days - IMPROVING  CT head/cervical: no acute findings  CXR: no focal consolidation   UA negative  orthostatics   PT/OT following - rec REHAB  Started gentle hydration N/S @ 50cc/hr  Started pureed diet with moderately thick liquids - tolerating well
increased generalized weakness over 4 days - IMPROVED  CT head/cervical: no acute findings  CXR: no focal consolidation   UA negative  orthostatics   PT/OT following - rec REHAB  Started gentle hydration N/S @ 50cc/hr  Started pureed diet with moderately thick liquids - tolerating well

## 2025-01-15 NOTE — PROGRESS NOTE ADULT - NUTRITIONAL ASSESSMENT
This patient has been assessed with a concern for Malnutrition and has been determined to have a diagnosis/diagnoses of Severe protein-calorie malnutrition.    This patient is being managed with:   Diet Pureed-  Moderately Thick Liquids (MODTHICKLIQS)  Entered: Jan 13 2025 11:55AM

## 2025-01-15 NOTE — PROGRESS NOTE ADULT - PROBLEM SELECTOR PLAN 5
Chronic stable  Continue simvastatin 20mg daily formulary  A1c 5.8  lipid panel normal
Chronic stable  Continue simvastatin 20mg daily formulary  A1c 5.8  lipid panel wnl
Chronic stable  Continue simvastatin 20mg daily formulary  A1c 5.8  lipid panel normal.

## 2025-01-15 NOTE — CHART NOTE - NSCHARTNOTEFT_GEN_A_CORE
89F w/ HTN, HLD, hypothyroidism, and osteoporosis p/w generalized weakness and s/p fall to the ER. Cardiology consulted for elevated troponin. Patient NSTEMI ASA/statin conservative treatment per cards.     Nurse called patient noted with left hand swelling + 4 edema, on exam shows signs of IV infiltration. IV line was d/c by nursing.  ROM , pulse intact to area. Instructed nursing to elevated extremity, and apply ICE. Continue to monitor if wore or absent pulses/ redness call provider for further evaluation.

## 2025-01-15 NOTE — PROVIDER CONTACT NOTE (OTHER) - SITUATION
Iv infiltration on right hand and wrist. +3 edema right hand. Iv infiltration on left hand and wrist. +3 edema right hand.

## 2025-01-15 NOTE — DISCHARGE NOTE PROVIDER - NSDCFUADDAPPT_GEN_ALL_CORE_FT
Follow up with medicine physician at rehab facility.     Follow up with your cardiologist or [facility] cardiology after discharge from rehab.

## 2025-01-15 NOTE — DIETITIAN INITIAL EVALUATION ADULT - PROBLEM SELECTOR PLAN 6
Chronic stable  Continue levothyroxine 75 mg daily   TSH in AM   Pharmacy emailed for [facility]

## 2025-01-15 NOTE — PROVIDER CONTACT NOTE (OTHER) - ACTION/TREATMENT ORDERED:
provider Abel Fine notified, provider at bedside, no acute signs of distress noted, Iv removed right upper extremity elevated with ice, safety maintained care ongoing.

## 2025-01-15 NOTE — DISCHARGE NOTE PROVIDER - NSFOLLOWUPCLINICS_GEN_ALL_ED_FT
Cardiology at [facility]  Cardiology  [address] [place] [address]  Phone: [phone]  Fax:

## 2025-01-15 NOTE — DISCHARGE NOTE NURSING/CASE MANAGEMENT/SOCIAL WORK - FINANCIAL ASSISTANCE
[facility] provides services at a reduced cost to those who are determined to be eligible through [facility]’s financial assistance program. Information regarding [facility]’s financial assistance program can be found by going to [email or web address] or by calling [phone].

## 2025-01-15 NOTE — DIETITIAN INITIAL EVALUATION ADULT - OTHER INFO
Per chart review, 89 yr old female with a pmh of HTN, HLD, hypothyroidism, osteoporosis who presents with generalized weakness and increased number of falls over the past 4 days. PT reports the other day she had a fall where she hit her head- denies LOC. Reports another time she fell off the couch and was not able to life herself up.     Patient seen at bedside. Reports she is very "thirsty", drank 4 cups of mod. thickened water at the time of visit. Pt is a [place], limited subjective information obtained. Pt noted currently on pureed diet with mod. thick liquids. IV hydration provided. Pt requires feeding assistance, recommend cont. to provide feeding assistance at all meals. Pt with severe fat loss and muscle wasting met criteria for severe malnutrition likely due to social environmental and circumstances. Recommend  to follow up. As per chart review, Pt has no GI distress (nausea/vomiting/diarrhea/constipation.) LBM 1/13 as per chart review. Bowel regimen ordered. Pt's skin noted with pressure injuries to stage III sacrum, stage I left shoulder. Will provide Hormel Shake 2x daily(1040 sean, 44gm pro) from kitchen to provide optimal nutrition. Recommend add ascorbic acid and MVI to promote wound healing. RD to remain available for further nutritional interventions as indicated.

## 2025-01-15 NOTE — DISCHARGE NOTE PROVIDER - NSDCMRMEDTOKEN_GEN_ALL_CORE_FT
acetaminophen 325 mg oral tablet: 2 tab(s) orally every 6 hours As needed Temp greater or equal to 38C (100.4F), Mild Pain (1 - 3)  alendronate 70 mg oral tablet: 1 tab(s) orally once a week  aluminum hydroxide-magnesium hydroxide 200 mg-200 mg/5 mL oral suspension: 30 milliliter(s) orally every 4 hours As needed Dyspepsia  aluminum hydroxide-magnesium hydroxide 200 mg-200 mg/5 mL oral suspension: 30 milliliter(s) orally every 4 hours As needed Dyspepsia  amLODIPine 5 mg oral tablet: 1 tab(s) orally once a day  ascorbic acid 500 mg oral tablet: 1 tab(s) orally once a day  aspirin 81 mg oral delayed release tablet: 1 tab(s) orally once a day  atorvastatin 10 mg oral tablet: 1 tab(s) orally once a day (at bedtime)  levothyroxine 75 mcg (0.075 mg) oral tablet: 1 tab(s) orally once a day  losartan 100 mg oral tablet: 1 tab(s) orally once a day  melatonin 3 mg oral tablet: 1 tab(s) orally once a day (at bedtime) As needed Insomnia  Multiple Vitamins with Minerals oral tablet: 1 tab(s) orally once a day  polyethylene glycol 3350 oral powder for reconstitution: 17 gram(s) orally once  senna leaf extract oral tablet: 2 tab(s) orally once a day (at bedtime)  simvastatin 20 mg oral tablet: 1 tab(s) orally once a day   acetaminophen 325 mg oral tablet: 2 tab(s) orally every 6 hours As needed Temp greater or equal to 38C (100.4F), Mild Pain (1 - 3)  alendronate 70 mg oral tablet: 1 tab(s) orally once a week  aluminum hydroxide-magnesium hydroxide 200 mg-200 mg/5 mL oral suspension: 30 milliliter(s) orally every 4 hours As needed Dyspepsia  amLODIPine 5 mg oral tablet: 1 tab(s) orally once a day  ascorbic acid 500 mg oral tablet: 1 tab(s) orally once a day  aspirin 81 mg oral delayed release tablet: 1 tab(s) orally once a day  levothyroxine 75 mcg (0.075 mg) oral tablet: 1 tab(s) orally once a day  losartan 100 mg oral tablet: 1 tab(s) orally once a day  melatonin 3 mg oral tablet: 1 tab(s) orally once a day (at bedtime) As needed Insomnia  Multiple Vitamins with Minerals oral tablet: 1 tab(s) orally once a day  polyethylene glycol 3350 oral powder for reconstitution: 17 gram(s) orally once  senna leaf extract oral tablet: 2 tab(s) orally once a day (at bedtime)  simvastatin 20 mg oral tablet: 1 tab(s) orally once a day

## 2025-01-15 NOTE — PROGRESS NOTE ADULT - ASSESSMENT
89F w/ HTN, HLD, hypothyroidism, and osteoporosis p/w generalized weakness and s/p fall to the ER. Cardiology consulted for elevated troponin.     1. HTN   2. HLD   3. Generalized weakness   4. S/p fall   5. Elevated troponin     -elevated troponin with CKMB and CK. no cp and EKG with NSR and mild TWI in V3-V4. would not trend trop any further   -given pt's advanced age and absence of ischemic sxs, would recommend conservative management   -cont bp control, cont home losartan and amlodipine   -cont asa and statin   -recommend TTE ECHO inpt for eval of LV/RV function and any significant WMAs   -monitor on tele   -GOC discussion recommended     Adry Huitron MD [facility]  Cardiology Attending, Derek-NS/SHEEBA  [employer] on [employer] Team      
89 yr old female with a pmh of HTN, HLD, hypothyroidism, osteoporosis who presents with generalized weakness and increased number of falls over the past 4 days. PT reports the other day she had a fall where she hit her head- denies LOC. Reports another time she fell off the couch and was not able to life herself up.     1/13/25: Pt is feeling much better today she is alert and oriented at her baseline, currently no complaints. Scheduled for TTE today to evaluate for RV/LV or WMA. Pt was not tolerating diet and will be switched to pureed diet. Denies nausea, vomiting, headaches, dizziness, shortness of breath. Will continue to trend trops.     
89F w/ HTN, HLD, hypothyroidism, and osteoporosis p/w generalized weakness and s/p fall to the ER. Cardiology consulted for elevated troponin.     1. HTN   2. HLD   3. Generalized weakness   4. S/p fall   5. Elevated troponin     -elevated troponin with CKMB and CK. no cp and EKG with NSR and mild TWI in V3-V4. would not trend trop any further   -TTE ECHO with normal EF and no WMAs   -given pt's advanced age and absence of ischemic sxs, would recommend conservative management   -cont bp control, cont home losartan and amlodipine   -cont asa and statin   -monitor on tele   -GOC discussion recommended   -pending DC w/ OP card f/u     Adry Huitron MD [facility]  Cardiology Attending, Derek-NS/SHEEBA  [employer] on [employer] Team    
89F w/ HTN, HLD, hypothyroidism, and osteoporosis p/w generalized weakness and s/p fall to the ER. Cardiology consulted for elevated troponin.     1. HTN   2. HLD   3. Generalized weakness   4. S/p fall   5. Elevated troponin     -elevated troponin with CKMB and CK. no cp and EKG with NSR and mild TWI in V3-V4. would not trend trop any further   -initially recommend TTE ECHO, however pt has no cardiac sxs at this time, VSS and otherwise back to baseline, TTE ECHO can be done as OP   -given pt's advanced age and absence of ischemic sxs, would recommend conservative management   -cont bp control, cont home losartan and amlodipine   -cont asa and statin   -monitor on tele   -GOC discussion recommended     Ardy Huitron MD [facility]  Cardiology Attending, Derek-NS/SHEEBA [employer] on [employer] Team  
89 yr old female with a pmh of HTN, HLD, hypothyroidism, osteoporosis who presents with generalized weakness and increased number of falls over the past 4 days. PT reports the other day she had a fall where she hit her head- denies LOC. Reports another time she fell off the couch and was not able to life herself up.     1/15/25: Pt was seen and examined at bedside, no current complaints. Pending TTE result, plan for rehab.          
89 yr old female with a pmh of HTN, HLD, hypothyroidism, osteoporosis who presents with generalized weakness and increased number of falls over the past 4 days. PT reports the other day she had a fall where she hit her head- denies LOC. Reports another time she fell off the couch and was not able to life herself up.     1/14/25: Pt was seen and examined at bedside, no current complaints. Scheduled for TTE today and pending PT evaluation. Plan for rehab.

## 2025-01-15 NOTE — PROGRESS NOTE ADULT - PROBLEM SELECTOR PLAN 7
Pt does not know names from medications nor doses- currently prescribed from [facility] [facility].
Pt does not know names from medications nor [facility]
Pt does not know names from medications nor [facility]

## 2025-01-15 NOTE — DIETITIAN INITIAL EVALUATION ADULT - PERTINENT MEDS FT
MEDICATIONS  (STANDING):  amLODIPine   Tablet 5 milliGRAM(s) Oral with breakfast  aspirin enteric coated 81 milliGRAM(s) Oral daily  atorvastatin 10 milliGRAM(s) Oral at bedtime  heparin   Injectable 5000 Unit(s) SubCutaneous every 12 hours  influenza  Vaccine (HIGH DOSE) 0.5 milliLiter(s) IntraMuscular once  levothyroxine 75 MICROGram(s) Oral daily  losartan 100 milliGRAM(s) Oral with breakfast  polyethylene glycol 3350 17 Gram(s) Oral once  senna 2 Tablet(s) Oral at bedtime  sodium chloride 0.9%. 1000 milliLiter(s) (50 mL/Hr) IV Continuous <Continuous>    MEDICATIONS  (PRN):  acetaminophen     Tablet .. 650 milliGRAM(s) Oral every 6 hours PRN Temp greater or equal to 38C (100.4F), Mild Pain (1 - 3)  aluminum hydroxide/magnesium hydroxide/simethicone Suspension 30 milliLiter(s) Oral every 4 hours PRN Dyspepsia  melatonin 3 milliGRAM(s) Oral at bedtime PRN Insomnia  ondansetron Injectable 4 milliGRAM(s) IV Push every 8 hours PRN Nausea and/or Vomiting

## 2025-01-15 NOTE — ADVANCED PRACTICE NURSE CONSULT - RECOMMEDATIONS
Topical Recommendations    Sacrum to BL buttocks: Cleanse with skin cleanser, pat dry. Apply Iraida moisture barrier cream twice a day and PRN with incontinent episodes.     While inpatient, Continue low air loss bed therapy, continue heel elevation, continue to turn & reposition per protocol, soft pillow between bony prominences, continue moisture management with barrier creams & single breathable pad, continue measures to decrease friction/shear/pressure. Continue with nutritional support as per dietary/orders.     Plan of care discussed with primary team.    Please contact Wound Care Service Line if we can be of further assistance (ext [phone]).  Topical Recommendations    Sacrum, R hip, thoracic spine: Cleanse with NS, pat dry. Apply Liquid barrier film to periwound skin (allow to dry). Apply Medihoney gel to base of wound, cover with silicone foam with border. Change daily and PRN if soiled.    Apply SWEEN daily moisturizer to BL UEs and LEs, avoiding in between toes.     While inpatient, Continue low air loss bed therapy, continue heel elevation, continue to turn & reposition per protocol, soft pillow between bony prominences, continue moisture management with barrier creams & single breathable pad, continue measures to decrease friction/shear/pressure. Continue with nutritional support as per dietary/orders.     Plan of care discussed with primary team.    Please contact Wound Care Service Line if we can be of further assistance (ext [phone]).

## 2025-01-15 NOTE — DISCHARGE NOTE PROVIDER - NSDCFUADDINST_GEN_ALL_CORE_FT
Topical Recommendations    Sacrum, R hip, thoracic spine: Cleanse with NS, pat dry. Apply Liquid barrier film to periwound skin (allow to dry). Apply Medihoney gel to base of wound, cover with silicone foam with border. Change daily and PRN if soiled.    Apply SWEEN daily moisturizer to BL UEs and LEs, avoiding in between toes.

## 2025-01-15 NOTE — DISCHARGE NOTE PROVIDER - HOSPITAL COURSE
89 yr old female with a pmh of HTN, HLD, hypothyroidism, osteoporosis who presents with generalized weakness and increased number of falls over the past 4 days. PT reports the other day she had a fall where she hit her head- denies LOC. Reports another time she fell off the couch and was not able   to life herself up. Patient had a CT head and cervical spine was done which showed no acute findings. She was given gentle   hydration and started on a pureed diet with moderately thick liquids. PT/OT saw her and recommended rehabiliation. For her   elevated troponin and syncope, cardiology was consulted who stated due to patients advanced age and absence of ischemic   symptoms, they recommended conservative management and held off heparin gtt. TTE showed EF of >75% with no   regional wall motion abnormalities. All of her chronic medical conditions were treated with her at home medications.     Pt is medically cleared to be discharged to subacute rehabiliation.     Nae Gonzalez MD   Hospitalist

## 2025-01-15 NOTE — PROGRESS NOTE ADULT - PROBLEM SELECTOR PLAN 2
Trop 257-> 217->271  -> 529->390  Pt denies chest pain   Cardiology consulted: given pt's advanced age and absence of ischemic sxs, would recommend conservative management and hold off heparin gtt. recommend TTE ECHO inpt for eval of LV/RV function and any significant WMAs.   C/W aspirin 81mg daily and continue simvastatin 20mg daily formulary  trend trop, repeat EKG, tele, TTE
Trop 257-> 217->271 - cardiology rec dc trending trops.  -> 529->390  Pt denies chest pain   Cardiology consulted: given pt's advanced age and absence of ischemic sxs, would recommend conservative management and hold off heparin gtt. recommend TTE ECHO inpt for eval of LV/RV function and any significant WMAs.   C/W aspirin 81mg daily and continue simvastatin 20mg daily formulary  Pending TTE result
Trop 257-> 217->271 - cardiology rec dc trending trops  -> 529->390  Pt denies chest pain   Cardiology consulted: given pt's advanced age and absence of ischemic sxs, would recommend conservative management and hold off heparin gtt. recommend TTE ECHO inpt for eval of LV/RV function and any significant WMAs.   C/W aspirin 81mg daily and continue simvastatin 20mg daily formulary  Pending TTE

## 2025-01-15 NOTE — PROGRESS NOTE ADULT - PROBLEM SELECTOR PLAN 3
pt reports increased falls past four days  CT head/cervical: no acute findings   Workup for generalized weakness as above   PT/OT rec REHAB.
pt reports increased falls past four days  CT head/cervical: no acute findings   Workup for generalized weakness as above   PT/OT rec REHAB
pt reports increased falls past four days  CT head/cervical: no acute findings   Workup for generalized weakness as above   PT/OT consult

## 2025-01-15 NOTE — DIETITIAN INITIAL EVALUATION ADULT - NSICDXPASTMEDICALHX_GEN_ALL_CORE_FT
PAST MEDICAL HISTORY:  HLD (hyperlipidemia)     HTN (hypertension)     Hypothyroidism     Osteoporosis

## 2025-01-15 NOTE — DIETITIAN INITIAL EVALUATION ADULT - PROBLEM SELECTOR PLAN 7
Pt does not know names from medications nor doses[facility]

## 2025-01-15 NOTE — DISCHARGE NOTE PROVIDER - REASON FOR ADMISSION
generalized weakness with increased falls and elevated troponin generalized weakness with increased falls and elevated tropinin

## 2025-01-15 NOTE — PROGRESS NOTE ADULT - REASON FOR ADMISSION
generalized weakness with increased falls and elevated tropinin
generalized weakness with increased falls and elevated troponin levels

## 2025-01-15 NOTE — PROGRESS NOTE ADULT - SUBJECTIVE AND OBJECTIVE BOX
Cardiology Attending Follow-up Note     Patient seen and examined at bedside.    Overnight Events:     no events   no cardiac sxs   TTE w/ normal EF and no WMAs     REVIEW OF SYSTEMS:  Constitutional:     [x ] negative [ ] fevers [ ] chills [ ] weight loss [ ] weight gain  HEENT:                  [x ] negative [ ] dry eyes [ ] eye irritation [ ] postnasal drip [ ] nasal congestion  CV:                         [ x] negative  [ ] chest pain [ ] orthopnea [ ] palpitations [ ] murmur  Resp:                     [ x] negative [ ] cough [ ] shortness of breath [ ] dyspnea [ ] wheezing [ ] sputum [ ]hemoptysis  GI:                          [ x] negative [ ] nausea [ ] vomiting [ ] diarrhea [ ] constipation [ ] abd pain [ ] dysphagia   :                        [ x] negative [ ] dysuria [ ] nocturia [ ] hematuria [ ] increased urinary frequency  Musculoskeletal: [ x] negative [ ] back pain [ ] myalgias [ ] arthralgias [ ] fracture  Skin:                       [ x] negative [ ] rash [ ] itch  Neurological:        [x ] negative [ ] headache [ ] dizziness [ ] syncope [ ] weakness [ ] numbness  Psychiatric:           [ x] negative [ ] anxiety [ ] depression  Endocrine:            [ x] negative [ ] diabetes [ ] thyroid problem  Heme/Lymph:      [ x] negative [ ] anemia [ ] bleeding problem  Allergic/Immune: [ x] negative [ ] itchy eyes [ ] nasal discharge [ ] hives [ ] angioedema    [ x] All other systems negative  [ ] Unable to assess ROS due to    Current Meds:  acetaminophen     Tablet .. 650 milliGRAM(s) Oral every 6 hours PRN  aluminum hydroxide/magnesium hydroxide/simethicone Suspension 30 milliLiter(s) Oral every 4 hours PRN  amLODIPine   Tablet 5 milliGRAM(s) Oral with breakfast  ascorbic acid 500 milliGRAM(s) Oral daily  aspirin enteric coated 81 milliGRAM(s) Oral daily  atorvastatin 10 milliGRAM(s) Oral at bedtime  heparin   Injectable 5000 Unit(s) SubCutaneous every 12 hours  influenza  Vaccine (HIGH DOSE) 0.5 milliLiter(s) IntraMuscular once  levothyroxine 75 MICROGram(s) Oral daily  losartan 100 milliGRAM(s) Oral with breakfast  melatonin 3 milliGRAM(s) Oral at bedtime PRN  multivitamin 1 Tablet(s) Oral daily  ondansetron Injectable 4 milliGRAM(s) IV Push every 8 hours PRN  polyethylene glycol 3350 17 Gram(s) Oral once  senna 2 Tablet(s) Oral at bedtime      PAST MEDICAL & SURGICAL HISTORY:  HTN (hypertension)      HLD (hyperlipidemia)      Hypothyroidism      Osteoporosis      No significant past surgical history          Vitals:  T(F): 97.7 (01-15), Max: 98.5 (01-15)  HR: 94 (01-15) (66 - 94)  BP: 148/65 (01-15) (134/71 - 148/65)  RR: 18 (01-15)  SpO2: 95% (01-15)  I&O's Summary    14 Jan 2025 07:01  -  15 Kirit 2025 07:00  --------------------------------------------------------  IN: 0 mL / OUT: 700 mL / NET: -700 mL        Physical Exam:  Appearance: No acute distress  HENT: No JVD   Cardiovascular: RRR, S1/S2, no murmurs  Respiratory: CTABL  Gastrointestinal: soft, NT ND, +BS  Musculoskeletal: No clubbing, no edema   Neurologic: Non-focal  Skin: No rashes, ecchymoses, or cyanosis                          13.8   7.85  )-----------( 250      ( 14 Jan 2025 06:45 )             42.7     01-14    140  |  107  |  24[H]  ----------------------------<  109[H]  3.8   |  23  |  0.54    Ca    8.3[L]      14 Jan 2025 06:45                    Cardiovascular Testings:

## 2025-01-15 NOTE — DIETITIAN INITIAL EVALUATION ADULT - PROBLEM SELECTOR PLAN 3
New  pt reports increased falls past four days  CT head/cervical: no acute findings   Workup for generalized weakness as above   PT/OT consult

## 2025-01-15 NOTE — DIETITIAN INITIAL EVALUATION ADULT - PERTINENT LABORATORY DATA
01-14    140  |  107  |  24[H]  ----------------------------<  109[H]  3.8   |  23  |  0.54    Ca    8.3[L]      14 Jan 2025 06:45    A1C with Estimated Average Glucose Result: 5.8 % (01-13-25 @ 06:35)

## 2025-01-15 NOTE — DISCHARGE NOTE NURSING/CASE MANAGEMENT/SOCIAL WORK - NSDCPEFALRISK_GEN_ALL_CORE
For information on Fall & Injury Prevention, visit: [email or web address] OR  [email or web address] OR  https://www.cdc.gov/steadi/patient.html

## 2025-01-15 NOTE — DIETITIAN INITIAL EVALUATION ADULT - ORAL INTAKE PTA/DIET HISTORY
Patient reports she lives alone. Reports her neighbors helps to  her groceries, and she has [employer], which she receives meal delivery once daily. Pt reports she is able to do simple cooking, such as scrambled eggs. Pt likely consumes <50% of EER daily. Pt reports takes MVI, Vitamin C and B12 supplementation. Pt unable to recall her UBW.

## 2025-01-15 NOTE — PROGRESS NOTE ADULT - PROBLEM SELECTOR PLAN 6
Chronic stable  Continue levothyroxine 75 mg QD  TSH in 2.92  Pharmacy emailed for [facility]
Chronic stable  Continue levothyroxine 75 mg QD  TSH in 2.92  Pharmacy emailed for [facility].
20.4
Chronic stable  Continue levothyroxine 75 mg daily   TSH in 2.92  [facility]

## 2025-01-15 NOTE — ADVANCED PRACTICE NURSE CONSULT - ASSESSMENT
General: A&Ox     , bedbound, incontinent of urine and stool. Skin warm, dry with increased moisture in intertriginous folds, adequate skin turgor, scattered areas of hyperpigmentation and hypopigmentation, scattered areas of eccyhmosis on bilateral upper extremities. Blanchable erythema on bilateral heels.  General: A&Ox4, kyphosis noted, able to turn with 1x assistance, incontinent of urine and stool at times, bedside commode with walker in use. Skin warm, dry with increased moisture in intertriginous folds, scattered areas of hyperpigmentation and hypopigmentation, scattered areas of ecchymosis on bilateral upper extremities with no hematomas. Blanchable erythema on bilateral heels.     R hip: Skin tear measuring 1cmx1.5cmx0.1cm with ~40% pink dermis and 60% yellow fibrin. No drainage, no odor. Periwound intact. No induration, no erythema, no edema, no temperature changes, no overt signs of infection noted.     Thoracic spine: Unstageable pressure injury evidenced by circular demarcated wound with 100% yellow moist slough, with mild malodor noted. Scant serosanguinous drainage with cleansing. Periwound with blanchable erythema not extending >2cm circumferentially. No induration, no crepitus, no fluctuance, no erythema, no edema, no temperature changes, no overt signs of infection noted. Goals of care: antimicrobial support, autolytic debridement, prevent from pressure friction shearing excess moisture.     Sacrum: Stage 2 pressure injury as evidenced by 0.8cmx0.5cmx0.1cm with 30% yellow firmly attached fibrin with 70% pink dermis. Periwound intact. Scant serosanguinous drainage with no odor. No induration, no erythema, no crepitus, no fluctuance, no edema, no temperature changes, no overt signs of infection noted. Goals of care: antimicrobial support, autolytic debridement, prevent from pressure friction shearing excess moisture.

## 2025-01-15 NOTE — PROGRESS NOTE ADULT - PROBLEM SELECTOR PLAN 4
Chronic moderate exacerbation   /66  Continue losartan 100mg daily and amlodipine 5mg daily   Monitor
Chronic moderate exacerbation   /66  Continue losartan 100mg daily and amlodipine 5mg daily   Monitor.
Chronic moderate exacerbation   /46  Continue losartan 100mg daily and amlodipine 5mg daily   Monitor

## 2025-01-15 NOTE — DISCHARGE NOTE PROVIDER - NSDCCPCAREPLAN_GEN_ALL_CORE_FT
PRINCIPAL DISCHARGE DIAGNOSIS  Diagnosis: Falls, initial encounter  Assessment and Plan of Treatment:

## 2025-01-15 NOTE — ADVANCED PRACTICE NURSE CONSULT - REASON FOR CONSULT
Patient seen on skin care rounds after wound care referral received for assessment of skin impairment and recommendations of topical management of a suspected sacrum 2. As per H&P, patient is a 89 yr old female with a pmh of HTN, HLD, hypothyroidism, osteoporosis who presents with generalized weakness and increased number of falls over the past 4 days. PT reports the other day she had a fall where she hit her head- denies LOC. Reports another time she fell off the couch and was not able to life herself up. Denies  headache, dizziness, chest pain, palpitations, SOB, abdominal pain, joint pain, diarrhea/constipation, urinary symptoms

## 2025-01-15 NOTE — DIETITIAN INITIAL EVALUATION ADULT - ADD RECOMMEND
1) Recommend continue with current diet, which remains appropriate at this time.   2) Encourage PO intake and honor food preferences as able.  Provided feeding assistance PRN.   3) Monitor PO intake, Labs, weights, BMs, and skin integrity.   4) Recommend consider adding ascorbic acid and MVI to promote wound healing.   5) RD to remain available for further nutritional interventions as indicated.

## 2025-01-15 NOTE — PROGRESS NOTE ADULT - SUBJECTIVE AND OBJECTIVE BOX
[facility] Division of Hospital Medicine  Nae Gonzalez MD  Pager (M-F, [phone])      89 yr old female with a pmhx of HTN, HLD, hypothyroidism, osteoporosis who presents with generalized weakness and increased number of falls over the past 4 days. PT reports the other day she had a fall where she hit her head- denies LOC. Reports another time she fell off the couch and was not able to life herself up. Pt lives at home alone and has a niece in [place] who is on her way to visit her. Denies headache, dizziness, chest pain, palpitations, SOB, abdominal pain, joint pain, diarrhea/constipation, urinary symptoms.      MEDICATIONS  (STANDING):  amLODIPine   Tablet 5 milliGRAM(s) Oral with breakfast  aspirin enteric coated 81 milliGRAM(s) Oral daily  atorvastatin 10 milliGRAM(s) Oral at bedtime  heparin   Injectable 5000 Unit(s) SubCutaneous every 12 hours  influenza  Vaccine (HIGH DOSE) 0.5 milliLiter(s) IntraMuscular once  levothyroxine 75 MICROGram(s) Oral daily  losartan 100 milliGRAM(s) Oral with breakfast  polyethylene glycol 3350 17 Gram(s) Oral once  senna 2 Tablet(s) Oral at bedtime  sodium chloride 0.9%. 1000 milliLiter(s) (50 mL/Hr) IV Continuous <Continuous>    MEDICATIONS  (PRN):  acetaminophen     Tablet .. 650 milliGRAM(s) Oral every 6 hours PRN Temp greater or equal to 38C (100.4F), Mild Pain (1 - 3)  aluminum hydroxide/magnesium hydroxide/simethicone Suspension 30 milliLiter(s) Oral every 4 hours PRN Dyspepsia  melatonin 3 milliGRAM(s) Oral at bedtime PRN Insomnia  ondansetron Injectable 4 milliGRAM(s) IV Push every 8 hours PRN Nausea and/or Vomiting      CAPILLARY BLOOD GLUCOSE        I&O's Summary    13 Jan 2025 07:01  -  14 Jan 2025 07:00  --------------------------------------------------------  IN: 1320 mL / OUT: 700 mL / NET: 620 mL        PHYSICAL EXAM:  Vital Signs Last 24 Hrs  T(C): 37 (14 Jan 2025 06:30), Max: 37.2 (13 Jan 2025 17:33)  T(F): 98.6 (14 Jan 2025 06:30), Max: 99 (13 Jan 2025 17:33)  HR: 65 (14 Jan 2025 09:15) (65 - 71)  BP: 127/66 (14 Jan 2025 09:15) (127/66 - 144/58)  BP(mean): --  RR: 16 (14 Jan 2025 06:30) (16 - 17)  SpO2: 95% (14 Jan 2025 06:30) (95% - 100%)    Parameters below as of 14 Jan 2025 06:30  Patient On (Oxygen Delivery Method): room air      CONSTITUTIONAL: NAD, well-developed, well-groomed  EYES: PERRLA; conjunctiva and sclera clear  ENMT: Moist oral mucosa, no pharyngeal injection or exudates; normal dentition  NECK: Supple, no palpable masses; no thyromegaly  RESPIRATORY: Normal respiratory effort; lungs are clear to auscultation bilaterally  CARDIOVASCULAR: Regular rate and rhythm, normal S1 and S2, no murmur/rub/gallop; No lower extremity edema; Peripheral pulses are 2+ bilaterally  ABDOMEN: Nontender to palpation, normoactive bowel sounds, no rebound/guarding; No hepatosplenomegaly  MUSCLOSKELETAL:  Normal gait; no clubbing or cyanosis of digits; no joint swelling or tenderness to palpation  PSYCH: A+O to person, place, and time; affect appropriate  NEUROLOGY: CN 2-12 are intact and symmetric; no gross sensory deficits;   SKIN: No rashes; no palpable lesions    LABS:                        13.8   7.85  )-----------( 250      ( 14 Jan 2025 06:45 )             42.7     01-14    140  |  107  |  24[H]  ----------------------------<  109[H]  3.8   |  23  |  0.54    Ca    8.3[L]      14 Jan 2025 06:45  Phos  3.0     01-12  Mg     2.20     01-12    TPro  6.2  /  Alb  3.6  /  TBili  0.7  /  DBili  x   /  AST  74[H]  /  ALT  94[H]  /  AlkPhos  64  01-12      CARDIAC MARKERS ( 13 Jan 2025 01:30 )  x     / x     / x     / x     / 20.6 ng/mL  CARDIAC MARKERS ( 12 Jan 2025 14:05 )  x     / x     / x     / x     / 32.4 ng/mL      Urinalysis Basic - ( 14 Jan 2025 06:45 )    Color: x / Appearance: x / SG: x / pH: x  Gluc: 109 mg/dL / Ketone: x  / Bili: x / Urobili: x   Blood: x / Protein: x / Nitrite: x   Leuk Esterase: x / RBC: x / WBC x   Sq Epi: x / Non Sq Epi: x / Bacteria: x        Urinalysis with Rflx Culture (collected 13 Jan 2025 15:30)        RADIOLOGY & ADDITIONAL TESTS:  Results Reviewed:   Imaging Personally Reviewed:  Electrocardiogram Personally Reviewed:    COORDINATION OF CARE:  Care Discussed with Consultants/Other Providers [Y/N]:  Prior or Outpatient Records Reviewed [Y/N]:

## 2025-01-15 NOTE — DISCHARGE NOTE PROVIDER - DETAILS OF MALNUTRITION DIAGNOSIS/DIAGNOSES
This patient has been assessed with a concern for Malnutrition and was treated during this hospitalization for the following Nutrition diagnosis/diagnoses:     -  01/15/2025: Severe protein-calorie malnutrition

## 2025-01-15 NOTE — DIETITIAN INITIAL EVALUATION ADULT - PROBLEM SELECTOR PLAN 2
(0) independent New  Trop 257-> 217, -> 529  Pt denies chest pain   Cardiology consulted: given pt's advanced age and absence of ischemic sxs, would recommend conservative management and hold off heparin gtt. recommend TTE ECHO inpt for eval of LV/RV function and any significant WMAs.   Will start aspirin 81mg daily and continue simvastatin 20mg daily formulary  trend trop, repeat EKG, tele, TTE